# Patient Record
Sex: MALE | Race: WHITE | NOT HISPANIC OR LATINO | Employment: OTHER | URBAN - METROPOLITAN AREA
[De-identification: names, ages, dates, MRNs, and addresses within clinical notes are randomized per-mention and may not be internally consistent; named-entity substitution may affect disease eponyms.]

---

## 2022-04-10 ENCOUNTER — HISTORICAL (OUTPATIENT)
Dept: ADMINISTRATIVE | Facility: HOSPITAL | Age: 57
End: 2022-04-10
Payer: MEDICARE

## 2022-04-27 VITALS
OXYGEN SATURATION: 96 % | DIASTOLIC BLOOD PRESSURE: 90 MMHG | HEIGHT: 70 IN | BODY MASS INDEX: 27.9 KG/M2 | WEIGHT: 194.88 LBS | SYSTOLIC BLOOD PRESSURE: 160 MMHG

## 2022-06-06 ENCOUNTER — OFFICE VISIT (OUTPATIENT)
Dept: RHEUMATOLOGY | Facility: CLINIC | Age: 57
End: 2022-06-06
Payer: MEDICARE

## 2022-06-06 VITALS
DIASTOLIC BLOOD PRESSURE: 80 MMHG | SYSTOLIC BLOOD PRESSURE: 150 MMHG | HEIGHT: 70 IN | TEMPERATURE: 99 F | HEART RATE: 86 BPM | OXYGEN SATURATION: 97 % | BODY MASS INDEX: 27.94 KG/M2 | WEIGHT: 195.19 LBS | RESPIRATION RATE: 18 BRPM

## 2022-06-06 DIAGNOSIS — M06.9 RHEUMATOID ARTHRITIS, INVOLVING UNSPECIFIED SITE, UNSPECIFIED WHETHER RHEUMATOID FACTOR PRESENT: ICD-10-CM

## 2022-06-06 DIAGNOSIS — G80.9 CEREBRAL PALSY, UNSPECIFIED TYPE: ICD-10-CM

## 2022-06-06 DIAGNOSIS — M35.3 POLYMYALGIA RHEUMATICA: Primary | ICD-10-CM

## 2022-06-06 DIAGNOSIS — I10 PRIMARY HYPERTENSION: ICD-10-CM

## 2022-06-06 PROCEDURE — 99213 OFFICE O/P EST LOW 20 MIN: CPT | Mod: PBBFAC | Performed by: INTERNAL MEDICINE

## 2022-06-06 PROCEDURE — 99214 OFFICE O/P EST MOD 30 MIN: CPT | Mod: S$PBB,,, | Performed by: INTERNAL MEDICINE

## 2022-06-06 PROCEDURE — 99214 PR OFFICE/OUTPT VISIT, EST, LEVL IV, 30-39 MIN: ICD-10-PCS | Mod: S$PBB,,, | Performed by: INTERNAL MEDICINE

## 2022-06-06 PROCEDURE — 99999 PR PBB SHADOW E&M-EST. PATIENT-LVL III: ICD-10-PCS | Mod: PBBFAC,,, | Performed by: INTERNAL MEDICINE

## 2022-06-06 PROCEDURE — 99999 PR PBB SHADOW E&M-EST. PATIENT-LVL III: CPT | Mod: PBBFAC,,, | Performed by: INTERNAL MEDICINE

## 2022-06-06 RX ORDER — METHOTREXATE 2.5 MG/1
15 TABLET ORAL
Qty: 30 TABLET | Refills: 5 | Status: SHIPPED | OUTPATIENT
Start: 2022-06-06 | End: 2022-10-06 | Stop reason: SDUPTHER

## 2022-06-06 RX ORDER — FOLIC ACID 1 MG/1
1000 TABLET ORAL EVERY MORNING
COMMUNITY
Start: 2022-04-29 | End: 2022-06-06 | Stop reason: SDUPTHER

## 2022-06-06 RX ORDER — METHOTREXATE 2.5 MG/1
15 TABLET ORAL
COMMUNITY
Start: 2022-04-08 | End: 2022-06-06 | Stop reason: SDUPTHER

## 2022-06-06 RX ORDER — PREDNISONE 5 MG/1
5 TABLET ORAL DAILY
COMMUNITY
Start: 2022-05-04 | End: 2022-06-06 | Stop reason: SDUPTHER

## 2022-06-06 RX ORDER — HYDROXYCHLOROQUINE SULFATE 200 MG/1
200 TABLET, FILM COATED ORAL 2 TIMES DAILY
Qty: 60 TABLET | Refills: 5 | Status: SHIPPED | OUTPATIENT
Start: 2022-06-06 | End: 2022-10-06 | Stop reason: SDUPTHER

## 2022-06-06 RX ORDER — FOLIC ACID 1 MG/1
1000 TABLET ORAL EVERY MORNING
Qty: 30 TABLET | Refills: 5 | Status: SHIPPED | OUTPATIENT
Start: 2022-06-06 | End: 2022-10-06 | Stop reason: SDUPTHER

## 2022-06-06 RX ORDER — AMLODIPINE BESYLATE 5 MG/1
5 TABLET ORAL DAILY
COMMUNITY
Start: 2022-06-06 | End: 2022-06-30

## 2022-06-06 RX ORDER — PREDNISONE 2.5 MG/1
2.5 TABLET ORAL DAILY
Qty: 30 TABLET | Refills: 5 | Status: SHIPPED | OUTPATIENT
Start: 2022-06-06 | End: 2022-10-06 | Stop reason: SDUPTHER

## 2022-06-06 RX ORDER — HYDROXYCHLOROQUINE SULFATE 200 MG/1
200 TABLET, FILM COATED ORAL 2 TIMES DAILY
COMMUNITY
Start: 2022-05-13 | End: 2022-06-06 | Stop reason: SDUPTHER

## 2022-06-06 NOTE — PROGRESS NOTES
"Subjective:       Patient ID: Fracisco Wynn is a 56 y.o. male.    Chief Complaint: 3 month follow up    The patient is complaining of joint pain involving the MCP PIP wrist elbow shoulders hips knees and ankles bilaterally.  The pain is 2/10 in intensity dull in quality and continuous.  That is associated with a morning stiffness lasting for more than 60 minutes.  Is also having difficulty maintaining a good night of sleep.  This has been associated with myalgias.  Muscle aches are 4/10 in intensity dull in quality and continuous.  They are associated with fatigue.  No fever no chills no others.    Review of Systems   Constitutional: Negative for appetite change, chills and fever.   HENT: Negative for congestion, ear pain, mouth sores, nosebleeds and trouble swallowing.    Eyes: Negative for photophobia and discharge.   Respiratory: Negative for chest tightness and shortness of breath.    Cardiovascular: Negative for chest pain.   Gastrointestinal: Negative for abdominal pain and vomiting.   Endocrine: Negative.    Genitourinary: Negative for hematuria.   Musculoskeletal:        As per HPI   Skin: Negative for rash.   Neurological: Negative for weakness.        Cerebral palsy stable         Objective:   BP (!) 150/80 (BP Location: Right arm, Patient Position: Sitting, BP Method: Medium (Automatic))   Pulse 86   Temp 98.8 °F (37.1 °C)   Resp 18   Ht 5' 10" (1.778 m)   Wt 88.5 kg (195 lb 3.2 oz)   SpO2 97%   BMI 28.01 kg/m²      Physical Exam   Constitutional: He is oriented to person, place, and time. He appears well-developed and well-nourished. No distress.   HENT:   Head: Normocephalic and atraumatic.   Right Ear: External ear normal.   Left Ear: External ear normal.   Eyes: Pupils are equal, round, and reactive to light.   Cardiovascular: Normal rate, regular rhythm and normal heart sounds.   Pulmonary/Chest: Breath sounds normal.   Abdominal: Soft. There is no abdominal tenderness.   Musculoskeletal: "      Right shoulder: Tenderness present.      Left shoulder: Tenderness present.      Right elbow: Tenderness present.      Left elbow: Tenderness present.      Right wrist: Tenderness present.      Left wrist: Tenderness present.      Cervical back: Neck supple.      Right hip: Tenderness present.      Left hip: Tenderness present.      Right knee: Tenderness present.      Left knee: Tenderness present.      Right ankle: Tenderness present.      Left ankle: Tenderness present.   Lymphadenopathy:     He has no cervical adenopathy.   Neurological: He is alert and oriented to person, place, and time. He displays normal reflexes. No cranial nerve deficit or sensory deficit. He exhibits normal muscle tone. Coordination normal.   Cerebral palsy stable   Skin: No rash noted. No erythema.   Vitals reviewed.      Right Side Rheumatological Exam     Examination finds the 1st PIP, 1st MCP, 2nd PIP, 2nd MCP, 3rd PIP, 3rd MCP, 4th PIP, 4th MCP, 5th PIP, 5th MCP, temporomandibular, ankle, 1st MTP, 2nd MTP, 3rd MTP, 4th MTP and 5th MTP normal.    The patient is tender to palpation of the shoulder, elbow, wrist, knee, 1st PIP, 1st MCP, 2nd PIP, 2nd MCP, 3rd PIP, 3rd MCP, 4th PIP, 4th MCP, 5th PIP, hip, ankle, 1st MTP, 2nd MTP, 3rd MTP, 4th MTP, 5th MTP, 1st toe IP, 2nd toe IP, 3rd toe IP, 4th toe IP and 5th toe IP    Left Side Rheumatological Exam     Examination finds the 1st PIP, 1st MCP, 2nd PIP, 2nd MCP, 3rd PIP, 3rd MCP, 4th PIP, 4th MCP, 5th PIP, 5th MCP, temporomandibular, ankle, 1st MTP, 2nd MTP, 3rd MTP, 4th MTP and 5th MTP normal.    The patient is tender to palpation of the shoulder, elbow, wrist, knee, 1st PIP, 1st MCP, 2nd PIP, 2nd MCP, 3rd PIP, 3rd MCP, 4th PIP, 4th MCP, 5th PIP, 5th MCP, hip, ankle, 1st MTP, 2nd MTP, 3rd MTP, 4th MTP, 5th MTP, 1st toe IP, 2nd toe IP, 3rd toe IP, 4th toe IP and 5th toe IP.         Completed Fibromyalgia exam 2/18 tender points.  No data to display     Assessment:       1. Polymyalgia  rheumatica    2. Rheumatoid arthritis, involving unspecified site, unspecified whether rheumatoid factor present    3. Primary hypertension    4. Cerebral palsy, unspecified type            Plan:       Problem List Items Addressed This Visit        Neuro    Cerebral palsy    Relevant Medications    folic acid (FOLVITE) 1 MG tablet    hydrOXYchloroQUINE (PLAQUENIL) 200 mg tablet    methotrexate 2.5 MG Tab    predniSONE (DELTASONE) 2.5 MG tablet       Cardiac/Vascular    Hypertension    Relevant Medications    folic acid (FOLVITE) 1 MG tablet    hydrOXYchloroQUINE (PLAQUENIL) 200 mg tablet    methotrexate 2.5 MG Tab    predniSONE (DELTASONE) 2.5 MG tablet       Immunology/Multi System    Polymyalgia rheumatica - Primary    Relevant Medications    folic acid (FOLVITE) 1 MG tablet    hydrOXYchloroQUINE (PLAQUENIL) 200 mg tablet    methotrexate 2.5 MG Tab    predniSONE (DELTASONE) 2.5 MG tablet    Rheumatoid arthritis    Relevant Medications    folic acid (FOLVITE) 1 MG tablet    hydrOXYchloroQUINE (PLAQUENIL) 200 mg tablet    methotrexate 2.5 MG Tab    predniSONE (DELTASONE) 2.5 MG tablet

## 2022-07-12 ENCOUNTER — TELEPHONE (OUTPATIENT)
Dept: RHEUMATOLOGY | Facility: CLINIC | Age: 57
End: 2022-07-12
Payer: MEDICARE

## 2022-10-06 ENCOUNTER — OFFICE VISIT (OUTPATIENT)
Dept: RHEUMATOLOGY | Facility: CLINIC | Age: 57
End: 2022-10-06
Payer: MEDICARE

## 2022-10-06 VITALS
BODY MASS INDEX: 28.2 KG/M2 | HEIGHT: 70 IN | RESPIRATION RATE: 18 BRPM | OXYGEN SATURATION: 98 % | HEART RATE: 80 BPM | WEIGHT: 197 LBS | SYSTOLIC BLOOD PRESSURE: 142 MMHG | DIASTOLIC BLOOD PRESSURE: 92 MMHG | TEMPERATURE: 98 F

## 2022-10-06 DIAGNOSIS — M06.9 RHEUMATOID ARTHRITIS, INVOLVING UNSPECIFIED SITE, UNSPECIFIED WHETHER RHEUMATOID FACTOR PRESENT: ICD-10-CM

## 2022-10-06 DIAGNOSIS — I10 PRIMARY HYPERTENSION: ICD-10-CM

## 2022-10-06 DIAGNOSIS — M35.3 POLYMYALGIA RHEUMATICA: Primary | ICD-10-CM

## 2022-10-06 DIAGNOSIS — G80.9 CEREBRAL PALSY, UNSPECIFIED TYPE: ICD-10-CM

## 2022-10-06 PROCEDURE — 99213 OFFICE O/P EST LOW 20 MIN: CPT | Mod: S$PBB,,, | Performed by: INTERNAL MEDICINE

## 2022-10-06 PROCEDURE — 99999 PR PBB SHADOW E&M-EST. PATIENT-LVL III: ICD-10-PCS | Mod: PBBFAC,,, | Performed by: INTERNAL MEDICINE

## 2022-10-06 PROCEDURE — 99999 PR PBB SHADOW E&M-EST. PATIENT-LVL III: CPT | Mod: PBBFAC,,, | Performed by: INTERNAL MEDICINE

## 2022-10-06 PROCEDURE — 99213 PR OFFICE/OUTPT VISIT, EST, LEVL III, 20-29 MIN: ICD-10-PCS | Mod: S$PBB,,, | Performed by: INTERNAL MEDICINE

## 2022-10-06 PROCEDURE — 99213 OFFICE O/P EST LOW 20 MIN: CPT | Mod: PBBFAC | Performed by: INTERNAL MEDICINE

## 2022-10-06 RX ORDER — PREDNISONE 2.5 MG/1
2.5 TABLET ORAL DAILY
Qty: 30 TABLET | Refills: 5 | Status: SHIPPED | OUTPATIENT
Start: 2022-10-06 | End: 2023-02-24 | Stop reason: SDUPTHER

## 2022-10-06 RX ORDER — HYDROXYCHLOROQUINE SULFATE 200 MG/1
200 TABLET, FILM COATED ORAL 2 TIMES DAILY
Qty: 60 TABLET | Refills: 5 | Status: SHIPPED | OUTPATIENT
Start: 2022-10-06 | End: 2023-02-24 | Stop reason: SDUPTHER

## 2022-10-06 RX ORDER — METHOTREXATE 2.5 MG/1
15 TABLET ORAL
Qty: 30 TABLET | Refills: 5 | Status: SHIPPED | OUTPATIENT
Start: 2022-10-06 | End: 2023-02-24 | Stop reason: SDUPTHER

## 2022-10-06 RX ORDER — FOLIC ACID 1 MG/1
1000 TABLET ORAL EVERY MORNING
Qty: 30 TABLET | Refills: 5 | Status: SHIPPED | OUTPATIENT
Start: 2022-10-06 | End: 2023-02-24 | Stop reason: SDUPTHER

## 2022-10-06 NOTE — PROGRESS NOTES
"Subjective:       Patient ID: Fracisco Wynn is a 57 y.o. male.    Chief Complaint: 4 month follow up (Doing well needs refills)    The patient is complaining of joint pain involving the MCP PIP wrist elbow shoulders hips knees and ankles bilaterally.  The pain is 1/10 in intensity dull in quality and continuous.  That is associated with a morning stiffness lasting for less than 60 minutes.  Is also having difficulty maintaining a good night of sleep.  This has been associated with myalgias.  Muscle aches are 1/10 in intensity dull in quality and continuous.  They are associated with fatigue.  No fever no chills no others.      Review of Systems   Constitutional:  Negative for appetite change, chills and fever.   HENT:  Negative for congestion, ear pain, mouth sores, nosebleeds and trouble swallowing.    Eyes:  Negative for photophobia and discharge.   Respiratory:  Negative for chest tightness and shortness of breath.    Cardiovascular:  Negative for chest pain.   Gastrointestinal:  Negative for abdominal pain and vomiting.   Endocrine: Negative.    Genitourinary:  Negative for hematuria.   Musculoskeletal:         As per HPI   Skin:  Negative for rash.   Neurological:  Negative for weakness.       Objective:   BP (!) 142/92 (BP Location: Right arm, Patient Position: Sitting, BP Method: Large (Automatic))   Pulse 80   Temp 97.8 °F (36.6 °C) (Temporal)   Resp 18   Ht 5' 10" (1.778 m)   Wt 89.4 kg (197 lb)   SpO2 98%   BMI 28.27 kg/m²      Physical Exam   Constitutional: He is oriented to person, place, and time. He appears well-developed and well-nourished. No distress.   HENT:   Head: Normocephalic and atraumatic.   Right Ear: External ear normal.   Left Ear: External ear normal.   Eyes: Pupils are equal, round, and reactive to light.   Cardiovascular: Normal rate, regular rhythm and normal heart sounds.   Pulmonary/Chest: Breath sounds normal.   Abdominal: Soft. There is no abdominal tenderness. "   Musculoskeletal:      Right shoulder: Normal.      Left shoulder: Normal.      Right elbow: Normal.      Left elbow: Normal.      Right wrist: Normal.      Left wrist: Normal.      Cervical back: Neck supple.      Right hip: Normal.      Left hip: Normal.      Right knee: Normal.      Left knee: Normal.   Lymphadenopathy:     He has no cervical adenopathy.   Neurological: He is alert and oriented to person, place, and time. He displays normal reflexes. No cranial nerve deficit or sensory deficit. He exhibits normal muscle tone. Coordination normal.   Skin: No rash noted. No erythema.   Vitals reviewed.      Right Side Rheumatological Exam     Examination finds the shoulder, elbow, wrist, knee, 1st PIP, 1st MCP, 2nd PIP, 2nd MCP, 3rd PIP, 3rd MCP, 4th PIP, 4th MCP, 5th PIP, 5th MCP, temporomandibular, hip, ankle, 1st MTP, 2nd MTP, 3rd MTP, 4th MTP and 5th MTP normal.    Left Side Rheumatological Exam     Examination finds the shoulder, elbow, wrist, knee, 1st PIP, 1st MCP, 2nd PIP, 2nd MCP, 3rd PIP, 3rd MCP, 4th PIP, 4th MCP, 5th PIP, 5th MCP, temporomandibular, hip, ankle, 1st MTP, 2nd MTP, 3rd MTP, 4th MTP and 5th MTP normal.       Completed Fibromyalgia exam 2/18 tender points.  No data to display     Assessment:       1. Polymyalgia rheumatica    2. Rheumatoid arthritis, involving unspecified site, unspecified whether rheumatoid factor present    3. Primary hypertension    4. Primary hypertension    5. Cerebral palsy, unspecified type              Plan:       Problem List Items Addressed This Visit          Neuro    Cerebral palsy    Relevant Medications    folic acid (FOLVITE) 1 MG tablet    hydrOXYchloroQUINE (PLAQUENIL) 200 mg tablet    methotrexate 2.5 MG Tab    predniSONE (DELTASONE) 2.5 MG tablet       Cardiac/Vascular    Hypertension    Relevant Medications    folic acid (FOLVITE) 1 MG tablet    hydrOXYchloroQUINE (PLAQUENIL) 200 mg tablet    methotrexate 2.5 MG Tab    predniSONE (DELTASONE) 2.5 MG  tablet       Immunology/Multi System    Polymyalgia rheumatica - Primary    Relevant Medications    folic acid (FOLVITE) 1 MG tablet    hydrOXYchloroQUINE (PLAQUENIL) 200 mg tablet    methotrexate 2.5 MG Tab    predniSONE (DELTASONE) 2.5 MG tablet    Rheumatoid arthritis    Relevant Medications    folic acid (FOLVITE) 1 MG tablet    hydrOXYchloroQUINE (PLAQUENIL) 200 mg tablet    methotrexate 2.5 MG Tab    predniSONE (DELTASONE) 2.5 MG tablet

## 2022-10-10 DIAGNOSIS — I10 PRIMARY HYPERTENSION: ICD-10-CM

## 2022-10-10 DIAGNOSIS — Z00.00 WELLNESS EXAMINATION: Primary | ICD-10-CM

## 2023-01-31 ENCOUNTER — TELEPHONE (OUTPATIENT)
Dept: INTERNAL MEDICINE | Facility: CLINIC | Age: 58
End: 2023-01-31
Payer: MEDICARE

## 2023-02-01 ENCOUNTER — OFFICE VISIT (OUTPATIENT)
Dept: INTERNAL MEDICINE | Facility: CLINIC | Age: 58
End: 2023-02-01
Payer: MEDICARE

## 2023-02-01 VITALS
OXYGEN SATURATION: 96 % | BODY MASS INDEX: 27.35 KG/M2 | DIASTOLIC BLOOD PRESSURE: 80 MMHG | HEART RATE: 94 BPM | HEIGHT: 70 IN | SYSTOLIC BLOOD PRESSURE: 132 MMHG | WEIGHT: 191 LBS | TEMPERATURE: 97 F

## 2023-02-01 DIAGNOSIS — G80.9 CEREBRAL PALSY, UNSPECIFIED TYPE: ICD-10-CM

## 2023-02-01 DIAGNOSIS — Z00.00 WELLNESS EXAMINATION: Primary | ICD-10-CM

## 2023-02-01 DIAGNOSIS — M35.3 POLYMYALGIA RHEUMATICA: ICD-10-CM

## 2023-02-01 PROBLEM — I10 HYPERTENSION: Status: RESOLVED | Noted: 2022-06-06 | Resolved: 2023-02-01

## 2023-02-01 PROCEDURE — 99396 PREV VISIT EST AGE 40-64: CPT | Mod: GZ,,, | Performed by: STUDENT IN AN ORGANIZED HEALTH CARE EDUCATION/TRAINING PROGRAM

## 2023-02-01 PROCEDURE — 99396 PR PREVENTIVE VISIT,EST,40-64: ICD-10-PCS | Mod: GZ,,, | Performed by: STUDENT IN AN ORGANIZED HEALTH CARE EDUCATION/TRAINING PROGRAM

## 2023-02-02 NOTE — PROGRESS NOTES
Subjective:      Fracisco Wynn  02/01/2023  [id number]      Chief Complaint: Annual Exam       HPI:  Mr Yap presents for annual wellness visit. Patient is doing well, no complaints today. Patient had been started on amlodipine 5 mg however blood pressure has been better controlled since lowering prednisone dose. Has stopped taking medication.     History reviewed. No pertinent past medical history.  Past Surgical History:   Procedure Laterality Date    CYST REMOVAL Right     right shoulder    FOOT SURGERY Bilateral     HERNIA REPAIR       History reviewed. No pertinent family history.  Social History     Tobacco Use    Smoking status: Never    Smokeless tobacco: Never   Substance and Sexual Activity    Alcohol use: Not Currently    Drug use: Not Currently    Sexual activity: Not Currently     Review of patient's allergies indicates:  No Known Allergies    The following were reviewed at this visit: active problem list, medication list, allergies, family history, social history, and health maintenance.    Medications:    Current Outpatient Medications:     folic acid (FOLVITE) 1 MG tablet, Take 1 tablet (1,000 mcg total) by mouth every morning., Disp: 30 tablet, Rfl: 5    hydrOXYchloroQUINE (PLAQUENIL) 200 mg tablet, Take 1 tablet (200 mg total) by mouth 2 (two) times daily., Disp: 60 tablet, Rfl: 5    methotrexate 2.5 MG Tab, Take 6 tablets (15 mg total) by mouth every 7 days., Disp: 30 tablet, Rfl: 5    predniSONE (DELTASONE) 2.5 MG tablet, Take 1 tablet (2.5 mg total) by mouth once daily., Disp: 30 tablet, Rfl: 5      Medications have been reviewed and reconciled with patient at this visit.  Barriers to medications reviewed with patient.    Adverse reactions to current medications reviewed with patient..    Over the counter medications reviewed and reconciled with patient.  Review of Systems   Constitutional:  Negative for chills, diaphoresis, fever and weight loss.   HENT:  Negative for congestion, ear  "discharge, sinus pain and sore throat.    Eyes:  Negative for photophobia.   Respiratory:  Negative for cough, hemoptysis and shortness of breath.    Cardiovascular:  Negative for chest pain, palpitations, claudication, leg swelling and PND.   Gastrointestinal:  Negative for constipation, diarrhea, nausea and vomiting.   Genitourinary:  Negative for dysuria, frequency and urgency.   Musculoskeletal:  Negative for back pain, joint pain, myalgias and neck pain.   Skin:  Negative for itching and rash.   Neurological:  Negative for dizziness, focal weakness and headaches.   Psychiatric/Behavioral:  Negative for depression. The patient does not have insomnia.          Objective:      Vitals:    02/01/23 1006   BP: 132/80   Pulse: 94   Temp: 97.3 °F (36.3 °C)   TempSrc: Temporal   SpO2: 96%   Weight: 86.6 kg (191 lb)   Height: 5' 10" (1.778 m)       Physical Exam  Constitutional:       Appearance: Normal appearance.   HENT:      Head: Normocephalic and atraumatic.   Cardiovascular:      Rate and Rhythm: Normal rate and regular rhythm.      Pulses: Normal pulses.   Pulmonary:      Effort: Pulmonary effort is normal.      Breath sounds: Normal breath sounds.   Abdominal:      General: Abdomen is flat. Bowel sounds are normal. There is no distension.      Palpations: Abdomen is soft.      Tenderness: There is no abdominal tenderness.   Musculoskeletal:         General: No tenderness. Normal range of motion.      Right lower leg: No edema.      Left lower leg: No edema.   Lymphadenopathy:      Cervical: No cervical adenopathy.   Neurological:      General: No focal deficit present.      Mental Status: He is alert and oriented to person, place, and time.             Assessment and Plan:       1. Wellness examination  Assessment & Plan:  Colonoscopy: up to date  Labs: due for lipid panel, ordered today       2. Polymyalgia rheumatica  Assessment & Plan:  Follows with Rheumatology      3. Cerebral palsy, unspecified " type  Assessment & Plan:  Stable  Follows with Neurology               Follow up: Follow up in about 6 months (around 8/1/2023) for Bp follow up.

## 2023-02-13 ENCOUNTER — LAB VISIT (OUTPATIENT)
Dept: LAB | Facility: HOSPITAL | Age: 58
End: 2023-02-13
Attending: STUDENT IN AN ORGANIZED HEALTH CARE EDUCATION/TRAINING PROGRAM
Payer: MEDICARE

## 2023-02-13 DIAGNOSIS — I10 PRIMARY HYPERTENSION: ICD-10-CM

## 2023-02-13 DIAGNOSIS — Z00.00 WELLNESS EXAMINATION: ICD-10-CM

## 2023-02-13 LAB
CHOLEST SERPL-MCNC: 178 MG/DL
CHOLEST/HDLC SERPL: 5 {RATIO} (ref 0–5)
HDLC SERPL-MCNC: 38 MG/DL (ref 35–60)
LDLC SERPL CALC-MCNC: 113 MG/DL (ref 50–140)
TRIGL SERPL-MCNC: 135 MG/DL (ref 34–140)
VLDLC SERPL CALC-MCNC: 27 MG/DL

## 2023-02-13 PROCEDURE — 80061 LIPID PANEL: CPT

## 2023-02-24 ENCOUNTER — OFFICE VISIT (OUTPATIENT)
Dept: RHEUMATOLOGY | Facility: CLINIC | Age: 58
End: 2023-02-24
Payer: MEDICARE

## 2023-02-24 VITALS
TEMPERATURE: 99 F | DIASTOLIC BLOOD PRESSURE: 89 MMHG | HEIGHT: 70 IN | HEART RATE: 84 BPM | SYSTOLIC BLOOD PRESSURE: 132 MMHG | OXYGEN SATURATION: 98 % | WEIGHT: 196 LBS | BODY MASS INDEX: 28.06 KG/M2

## 2023-02-24 DIAGNOSIS — M35.3 POLYMYALGIA RHEUMATICA: ICD-10-CM

## 2023-02-24 DIAGNOSIS — I10 PRIMARY HYPERTENSION: ICD-10-CM

## 2023-02-24 DIAGNOSIS — M06.9 RHEUMATOID ARTHRITIS, INVOLVING UNSPECIFIED SITE, UNSPECIFIED WHETHER RHEUMATOID FACTOR PRESENT: ICD-10-CM

## 2023-02-24 DIAGNOSIS — G80.9 CEREBRAL PALSY, UNSPECIFIED TYPE: Primary | ICD-10-CM

## 2023-02-24 PROCEDURE — 99214 OFFICE O/P EST MOD 30 MIN: CPT | Mod: S$PBB,,, | Performed by: INTERNAL MEDICINE

## 2023-02-24 PROCEDURE — 99214 PR OFFICE/OUTPT VISIT, EST, LEVL IV, 30-39 MIN: ICD-10-PCS | Mod: S$PBB,,, | Performed by: INTERNAL MEDICINE

## 2023-02-24 PROCEDURE — 99213 OFFICE O/P EST LOW 20 MIN: CPT | Mod: PBBFAC | Performed by: INTERNAL MEDICINE

## 2023-02-24 PROCEDURE — 99999 PR PBB SHADOW E&M-EST. PATIENT-LVL III: ICD-10-PCS | Mod: PBBFAC,,, | Performed by: INTERNAL MEDICINE

## 2023-02-24 PROCEDURE — 99999 PR PBB SHADOW E&M-EST. PATIENT-LVL III: CPT | Mod: PBBFAC,,, | Performed by: INTERNAL MEDICINE

## 2023-02-24 RX ORDER — METHOTREXATE 2.5 MG/1
15 TABLET ORAL
Qty: 30 TABLET | Refills: 5 | Status: SHIPPED | OUTPATIENT
Start: 2023-02-24 | End: 2023-06-27 | Stop reason: SDUPTHER

## 2023-02-24 RX ORDER — FOLIC ACID 1 MG/1
1000 TABLET ORAL EVERY MORNING
Qty: 30 TABLET | Refills: 5 | Status: SHIPPED | OUTPATIENT
Start: 2023-02-24 | End: 2023-06-27 | Stop reason: SDUPTHER

## 2023-02-24 RX ORDER — PREDNISONE 2.5 MG/1
2.5 TABLET ORAL DAILY PRN
Qty: 30 TABLET | Refills: 5 | Status: SHIPPED | OUTPATIENT
Start: 2023-02-24 | End: 2023-08-02

## 2023-02-24 RX ORDER — HYDROXYCHLOROQUINE SULFATE 200 MG/1
200 TABLET, FILM COATED ORAL 2 TIMES DAILY
Qty: 60 TABLET | Refills: 5 | Status: SHIPPED | OUTPATIENT
Start: 2023-02-24 | End: 2023-06-27 | Stop reason: SDUPTHER

## 2023-02-24 NOTE — PROGRESS NOTES
"Subjective:       Patient ID: Fracisco Wynn is a 57 y.o. male.    Chief Complaint: Follow-up (Follow up. No complaints)    The patient completed 2 years of steroids for PMR. I will change the prednisone to only PRN and continue the steroids sparing drugs  The patient is complaining of joint pain involving the MCP PIP wrist elbow shoulders hips knees and ankles bilaterally.  The pain is 1/10 in intensity dull in quality and continuous.  That is associated with a morning stiffness lasting for less than 60 minutes.  Is not having difficulty maintaining a good night of sleep.    No fever no chills no others.  Labs checked during this visit    Review of Systems   Constitutional:  Negative for appetite change, chills and fever.   HENT:  Negative for congestion, ear pain, mouth sores, nosebleeds and trouble swallowing.    Eyes:  Negative for photophobia and discharge.   Respiratory:  Negative for chest tightness and shortness of breath.    Cardiovascular:  Negative for chest pain.   Gastrointestinal:  Negative for abdominal pain and vomiting.   Endocrine: Negative.    Genitourinary:  Negative for hematuria.   Musculoskeletal:         As per HPI   Skin:  Negative for rash.   Neurological:  Negative for weakness.       Objective:   /89 (BP Location: Right arm, Patient Position: Sitting, BP Method: Medium (Automatic))   Pulse 84   Temp 98.7 °F (37.1 °C) (Temporal)   Ht 5' 10" (1.778 m)   Wt 88.9 kg (196 lb)   SpO2 98%   BMI 28.12 kg/m²      Physical Exam   Constitutional: He is oriented to person, place, and time. He appears well-developed and well-nourished. No distress.   HENT:   Head: Normocephalic and atraumatic.   Right Ear: External ear normal.   Left Ear: External ear normal.   Eyes: Pupils are equal, round, and reactive to light.   Cardiovascular: Normal rate, regular rhythm and normal heart sounds.   Pulmonary/Chest: Breath sounds normal.   Abdominal: Soft. There is no abdominal tenderness. "   Musculoskeletal:      Right shoulder: Normal.      Left shoulder: Normal.      Right elbow: Normal.      Left elbow: Normal.      Right wrist: Normal.      Left wrist: Normal.      Cervical back: Neck supple.      Right hip: Normal.      Left hip: Normal.      Right knee: Normal.      Left knee: Normal.   Lymphadenopathy:     He has no cervical adenopathy.   Neurological: He is alert and oriented to person, place, and time. He displays normal reflexes. No cranial nerve deficit or sensory deficit. He exhibits normal muscle tone. Coordination normal.   Skin: No rash noted. No erythema.   Vitals reviewed.      Right Side Rheumatological Exam     Examination finds the shoulder, elbow, wrist, knee, 1st PIP, 1st MCP, 2nd PIP, 2nd MCP, 3rd PIP, 3rd MCP, 4th PIP, 4th MCP, 5th PIP, 5th MCP, temporomandibular, hip, ankle, 1st MTP, 2nd MTP, 3rd MTP, 4th MTP and 5th MTP normal.    Left Side Rheumatological Exam     Examination finds the shoulder, elbow, wrist, knee, 1st PIP, 1st MCP, 2nd PIP, 2nd MCP, 3rd PIP, 3rd MCP, 4th PIP, 4th MCP, 5th PIP, 5th MCP, temporomandibular, hip, ankle, 1st MTP, 2nd MTP, 3rd MTP, 4th MTP and 5th MTP normal.       Completed Fibromyalgia exam 2/18 tender points.  No data to display     Assessment:       1. Cerebral palsy, unspecified type    2. Polymyalgia rheumatica    3. Rheumatoid arthritis, involving unspecified site, unspecified whether rheumatoid factor present    4. Primary hypertension            Medication List with Changes/Refills   Changed and/or Refilled Medications    Modified Medication Previous Medication    FOLIC ACID (FOLVITE) 1 MG TABLET folic acid (FOLVITE) 1 MG tablet       Take 1 tablet (1,000 mcg total) by mouth every morning.    Take 1 tablet (1,000 mcg total) by mouth every morning.       Start Date: 2/24/2023 End Date: --    Start Date: 10/6/2022 End Date: 2/24/2023    HYDROXYCHLOROQUINE (PLAQUENIL) 200 MG TABLET hydrOXYchloroQUINE (PLAQUENIL) 200 mg tablet       Take 1  tablet (200 mg total) by mouth 2 (two) times daily.    Take 1 tablet (200 mg total) by mouth 2 (two) times daily.       Start Date: 2/24/2023 End Date: --    Start Date: 10/6/2022 End Date: 2/24/2023    METHOTREXATE 2.5 MG TAB methotrexate 2.5 MG Tab       Take 6 tablets (15 mg total) by mouth every 7 days.    Take 6 tablets (15 mg total) by mouth every 7 days.       Start Date: 2/24/2023 End Date: --    Start Date: 10/6/2022 End Date: 2/24/2023    PREDNISONE (DELTASONE) 2.5 MG TABLET predniSONE (DELTASONE) 2.5 MG tablet       Take 1 tablet (2.5 mg total) by mouth daily as needed (flare ups/pain).    Take 1 tablet (2.5 mg total) by mouth once daily.       Start Date: 2/24/2023 End Date: --    Start Date: 10/6/2022 End Date: 2/24/2023         Plan:         Problem List Items Addressed This Visit          Neuro    Cerebral palsy - Primary    Relevant Medications    folic acid (FOLVITE) 1 MG tablet    hydrOXYchloroQUINE (PLAQUENIL) 200 mg tablet    methotrexate 2.5 MG Tab    predniSONE (DELTASONE) 2.5 MG tablet       Immunology/Multi System    Polymyalgia rheumatica    Relevant Medications    folic acid (FOLVITE) 1 MG tablet    hydrOXYchloroQUINE (PLAQUENIL) 200 mg tablet    methotrexate 2.5 MG Tab    predniSONE (DELTASONE) 2.5 MG tablet    Rheumatoid arthritis    Relevant Medications    folic acid (FOLVITE) 1 MG tablet    hydrOXYchloroQUINE (PLAQUENIL) 200 mg tablet    methotrexate 2.5 MG Tab    predniSONE (DELTASONE) 2.5 MG tablet     Other Visit Diagnoses       Primary hypertension        reduce prednisone to 2.5mg daily.    Relevant Medications    folic acid (FOLVITE) 1 MG tablet    hydrOXYchloroQUINE (PLAQUENIL) 200 mg tablet    methotrexate 2.5 MG Tab    predniSONE (DELTASONE) 2.5 MG tablet

## 2023-05-03 ENCOUNTER — TELEPHONE (OUTPATIENT)
Dept: RHEUMATOLOGY | Facility: CLINIC | Age: 58
End: 2023-05-03
Payer: MEDICARE

## 2023-05-03 DIAGNOSIS — L40.9 PSORIASIS: Primary | ICD-10-CM

## 2023-05-03 RX ORDER — KETOCONAZOLE 20 MG/G
CREAM TOPICAL DAILY
Qty: 60 G | Refills: 5 | Status: SHIPPED | OUTPATIENT
Start: 2023-05-03

## 2023-05-03 NOTE — TELEPHONE ENCOUNTER
----- Message from India Masters sent at 5/3/2023  2:28 PM CDT -----  Regarding: Medication refill  Mother (Regina Hernandez) called requesting refill for Ketoconazole (2% cream). It was prescribed about (1) year ago for psoriasis. [facility] in the [facility]

## 2023-05-08 PROBLEM — Z00.00 WELLNESS EXAMINATION: Status: RESOLVED | Noted: 2023-02-01 | Resolved: 2023-05-08

## 2023-06-26 NOTE — PROGRESS NOTES
"Subjective:           Patient ID: Fracisco Wynn is a 57 y.o. male.    Chief Complaint: Disease Management (4 month follow up )      Mr. Wynn is a 57-year-old male here for follow-up.  History of cerebral palsy and ambulating with walker today. The patient completed 2 years of steroids for PMR.  His blood pressure is elevated today. Reports his blood pressure raises on occasion and was only started on the Amlodopine because of HTN at dentist implant office. Reports he does not take it daily, he does have an appointment with his PCP next month. Denies headache, blurry vision, or nose bleeds.    Today The patient is reporting joint pain involving the MCP PIP wrist elbow shoulders hips knees and ankles bilaterally but mostly in his hips. The pain is 2/10 in intensity dull in quality and continuous.  That is associated with a morning stiffness lasting for less than 30 minutes.   This has been associated with myalgias.  Muscle aches are 2/10 in intensity dull in quality and continuous.  They are associated with fatigue.  Denies fever, chills, or recent infections.  He feels like his medications are all working well for him right now.       Review of Systems   Constitutional:  Negative for appetite change, chills and fever.   HENT:  Negative for congestion, ear pain, mouth sores, nosebleeds and trouble swallowing.    Eyes:  Negative for photophobia and discharge.   Respiratory:  Negative for chest tightness and shortness of breath.    Cardiovascular:  Negative for chest pain.   Gastrointestinal:  Negative for abdominal pain and vomiting.   Endocrine: Negative.    Genitourinary:  Negative for hematuria.   Musculoskeletal:         As per HPI   Skin:  Negative for rash.   Neurological:  Negative for weakness.       Objective:   /78 (BP Location: Right arm, Patient Position: Sitting, BP Method: Medium (Automatic))   Pulse 79   Resp 18   Ht 5' 10" (1.778 m)   Wt 89.1 kg (196 lb 6.4 oz)   SpO2 96%   BMI " 28.18 kg/m²          Physical Exam   Constitutional: He is oriented to person, place, and time. He appears well-developed and well-nourished. No distress.   HENT:   Head: Normocephalic and atraumatic.   Right Ear: External ear normal.   Left Ear: External ear normal.   Eyes: Pupils are equal, round, and reactive to light.   Cardiovascular: Normal rate.   Pulmonary/Chest: Effort normal and breath sounds normal.   Abdominal: Soft. There is no abdominal tenderness.   Musculoskeletal:      Cervical back: Neck supple.      Comments: Left hand contracture   Lymphadenopathy:     He has no cervical adenopathy.   Neurological: He is alert and oriented to person, place, and time. He displays normal reflexes. No cranial nerve deficit or sensory deficit. He exhibits normal muscle tone. Coordination normal.   Skin: No rash noted. No erythema.   Vitals reviewed.     Completed Fibromyalgia exam 12/18 tender points.    No data to display     Assessment:         Medication List with Changes/Refills   Current Medications    AMLODIPINE (NORVASC) 5 MG TABLET    Take 5 mg by mouth once daily.       Start Date: 4/8/2022  End Date: --    DIAZEPAM (VALIUM) 5 MG TABLET    Take 5 mg by mouth as needed.       Start Date: 2/23/2023 End Date: --    KETOCONAZOLE (NIZORAL) 2 % CREAM    Apply topically once daily.       Start Date: 5/3/2023  End Date: --    PREDNISONE (DELTASONE) 2.5 MG TABLET    Take 1 tablet (2.5 mg total) by mouth daily as needed (flare ups/pain).       Start Date: 2/24/2023 End Date: --   Changed and/or Refilled Medications    Modified Medication Previous Medication    FOLIC ACID (FOLVITE) 1 MG TABLET folic acid (FOLVITE) 1 MG tablet       Take 1 tablet (1,000 mcg total) by mouth every morning.    Take 1 tablet (1,000 mcg total) by mouth every morning.       Start Date: 6/27/2023 End Date: --    Start Date: 2/24/2023 End Date: 6/27/2023    HYDROXYCHLOROQUINE (PLAQUENIL) 200 MG TABLET hydrOXYchloroQUINE (PLAQUENIL) 200 mg  tablet       Take 1 tablet (200 mg total) by mouth 2 (two) times daily.    Take 1 tablet (200 mg total) by mouth 2 (two) times daily.       Start Date: 6/27/2023 End Date: --    Start Date: 2/24/2023 End Date: 6/27/2023    METHOTREXATE 2.5 MG TAB methotrexate 2.5 MG Tab       Take 6 tablets (15 mg total) by mouth every 7 days.    Take 6 tablets (15 mg total) by mouth every 7 days.       Start Date: 6/27/2023 End Date: --    Start Date: 2/24/2023 End Date: 6/27/2023         ICD-10-CM ICD-9-CM   1. Rheumatoid arthritis, involving unspecified site, unspecified whether rheumatoid factor present  M06.9 714.0   2. Polymyalgia rheumatica  M35.3 725   3. Cerebral palsy, unspecified type  G80.9 343.9   4. Primary hypertension  I10 401.9           Plan:       1. Rheumatoid arthritis, involving unspecified site, unspecified whether rheumatoid factor present  Assessment & Plan:  Continue methotrexate 15 mg Q 7 days  Continue folic acid daily  Continue hydroxychloroquine 200 mg b.i.d.    Patient reports he still mows his grass and uses stationary bike at home. He has not been taking the prednisone as needed because he has not needed it. He is happy with his current treatment    Labs ordered today for continued monitoring (on MTX). CMP completed in Feb 2023 but no CBC to review.      Orders:  -     methotrexate 2.5 MG Tab; Take 6 tablets (15 mg total) by mouth every 7 days.  Dispense: 30 tablet; Refill: 5  -     folic acid (FOLVITE) 1 MG tablet; Take 1 tablet (1,000 mcg total) by mouth every morning.  Dispense: 30 tablet; Refill: 5  -     hydrOXYchloroQUINE (PLAQUENIL) 200 mg tablet; Take 1 tablet (200 mg total) by mouth 2 (two) times daily.  Dispense: 60 tablet; Refill: 5  -     CBC Auto Differential; Future; Expected date: 06/27/2023  -     Comprehensive Metabolic Panel; Future; Expected date: 06/27/2023  -     C-Reactive Protein; Future; Expected date: 06/27/2023    2. Polymyalgia rheumatica  Overview:  The patient completed 2  years of steroids for PMR. I will change the prednisone to only PRN and continue the steroids sparing drugs    Assessment & Plan:  Continue prednisone 2.5 mg daily p.r.n.    Orders:  -     methotrexate 2.5 MG Tab; Take 6 tablets (15 mg total) by mouth every 7 days.  Dispense: 30 tablet; Refill: 5  -     folic acid (FOLVITE) 1 MG tablet; Take 1 tablet (1,000 mcg total) by mouth every morning.  Dispense: 30 tablet; Refill: 5  -     hydrOXYchloroQUINE (PLAQUENIL) 200 mg tablet; Take 1 tablet (200 mg total) by mouth 2 (two) times daily.  Dispense: 60 tablet; Refill: 5    3. Cerebral palsy, unspecified type    4. Primary hypertension  Comments:  reduce prednisone to 2.5mg daily.  Assessment & Plan:  BP today is 183/84  Manual recheck: 136/78  Continue RX medications as prescribed  Low Sodium Diet (Dash Diet - less than 2 grams of sodium per day).  Maintain healthy weight with goal BMI <30. Exercise 30 minutes per day 5 days per week.      Reports his blood pressure raises on occasion and was only started on the Amlodopine because of HTN at dentist implant office. Reports he does not take it daily, he does have an appointment with his PCP next month. Denies headache, blurry vision, or nose bleeds.             31 minutes of total time spent on the encounter, which includes face to face time and non-face to face time preparing to see the patient (eg, review of tests), Obtaining and/or reviewing separately obtained history, Documenting clinical information in the electronic or other health record, Independently interpreting results (not separately reported) and communicating results to the patient/family/caregiver, or Care coordination (not separately reported).

## 2023-06-26 NOTE — ASSESSMENT & PLAN NOTE
Continue methotrexate 15 mg Q 7 days  Continue folic acid daily  Continue hydroxychloroquine 200 mg b.i.d.    Patient reports he still mows his grass and uses stationary bike at home. He has not been taking the prednisone as needed because he has not needed it. He is happy with his current treatment    Labs ordered today for continued monitoring (on MTX). CMP completed in Feb 2023 but no CBC to review.

## 2023-06-27 ENCOUNTER — OFFICE VISIT (OUTPATIENT)
Dept: RHEUMATOLOGY | Facility: CLINIC | Age: 58
End: 2023-06-27
Payer: MEDICARE

## 2023-06-27 VITALS
HEART RATE: 79 BPM | BODY MASS INDEX: 28.12 KG/M2 | RESPIRATION RATE: 18 BRPM | DIASTOLIC BLOOD PRESSURE: 78 MMHG | SYSTOLIC BLOOD PRESSURE: 136 MMHG | HEIGHT: 70 IN | WEIGHT: 196.38 LBS | OXYGEN SATURATION: 96 %

## 2023-06-27 DIAGNOSIS — M06.9 RHEUMATOID ARTHRITIS, INVOLVING UNSPECIFIED SITE, UNSPECIFIED WHETHER RHEUMATOID FACTOR PRESENT: ICD-10-CM

## 2023-06-27 DIAGNOSIS — I10 PRIMARY HYPERTENSION: ICD-10-CM

## 2023-06-27 DIAGNOSIS — M35.3 POLYMYALGIA RHEUMATICA: ICD-10-CM

## 2023-06-27 DIAGNOSIS — G80.9 CEREBRAL PALSY, UNSPECIFIED TYPE: ICD-10-CM

## 2023-06-27 PROCEDURE — 99213 OFFICE O/P EST LOW 20 MIN: CPT | Mod: PBBFAC

## 2023-06-27 PROCEDURE — 99214 OFFICE O/P EST MOD 30 MIN: CPT | Mod: S$PBB,,,

## 2023-06-27 PROCEDURE — 99999 PR PBB SHADOW E&M-EST. PATIENT-LVL III: ICD-10-PCS | Mod: PBBFAC,,,

## 2023-06-27 PROCEDURE — 99214 PR OFFICE/OUTPT VISIT, EST, LEVL IV, 30-39 MIN: ICD-10-PCS | Mod: S$PBB,,,

## 2023-06-27 PROCEDURE — 99999 PR PBB SHADOW E&M-EST. PATIENT-LVL III: CPT | Mod: PBBFAC,,,

## 2023-06-27 RX ORDER — AMLODIPINE BESYLATE 5 MG/1
5 TABLET ORAL DAILY
COMMUNITY
Start: 2022-04-08 | End: 2023-08-02

## 2023-06-27 RX ORDER — HYDROXYCHLOROQUINE SULFATE 200 MG/1
200 TABLET, FILM COATED ORAL 2 TIMES DAILY
Qty: 60 TABLET | Refills: 5 | Status: SHIPPED | OUTPATIENT
Start: 2023-06-27 | End: 2024-01-05 | Stop reason: SDUPTHER

## 2023-06-27 RX ORDER — DIAZEPAM 5 MG/1
5 TABLET ORAL
COMMUNITY
Start: 2023-02-23 | End: 2023-08-02

## 2023-06-27 RX ORDER — FOLIC ACID 1 MG/1
1000 TABLET ORAL EVERY MORNING
Qty: 30 TABLET | Refills: 5 | Status: SHIPPED | OUTPATIENT
Start: 2023-06-27 | End: 2024-01-05 | Stop reason: SDUPTHER

## 2023-06-27 RX ORDER — METHOTREXATE 2.5 MG/1
15 TABLET ORAL
Qty: 30 TABLET | Refills: 5 | Status: SHIPPED | OUTPATIENT
Start: 2023-06-27 | End: 2024-01-05 | Stop reason: SDUPTHER

## 2023-06-27 NOTE — ASSESSMENT & PLAN NOTE
BP today is 183/84  Manual recheck: 136/78  Continue RX medications as prescribed  Low Sodium Diet (Dash Diet - less than 2 grams of sodium per day).  Maintain healthy weight with goal BMI <30. Exercise 30 minutes per day 5 days per week.      Reports his blood pressure raises on occasion and was only started on the Amlodopine because of HTN at dentist implant office. Reports he does not take it daily, he does have an appointment with his PCP next month. Denies headache, blurry vision, or nose bleeds.

## 2023-07-26 ENCOUNTER — TELEPHONE (OUTPATIENT)
Dept: INTERNAL MEDICINE | Facility: CLINIC | Age: 58
End: 2023-07-26
Payer: MEDICARE

## 2023-08-02 ENCOUNTER — OFFICE VISIT (OUTPATIENT)
Dept: INTERNAL MEDICINE | Facility: CLINIC | Age: 58
End: 2023-08-02
Payer: MEDICARE

## 2023-08-02 VITALS
OXYGEN SATURATION: 96 % | DIASTOLIC BLOOD PRESSURE: 84 MMHG | BODY MASS INDEX: 28.06 KG/M2 | HEART RATE: 83 BPM | SYSTOLIC BLOOD PRESSURE: 132 MMHG | HEIGHT: 70 IN | WEIGHT: 196 LBS | RESPIRATION RATE: 16 BRPM

## 2023-08-02 DIAGNOSIS — I10 PRIMARY HYPERTENSION: ICD-10-CM

## 2023-08-02 PROCEDURE — 99213 OFFICE O/P EST LOW 20 MIN: CPT | Mod: ,,, | Performed by: STUDENT IN AN ORGANIZED HEALTH CARE EDUCATION/TRAINING PROGRAM

## 2023-08-02 PROCEDURE — 99213 PR OFFICE/OUTPT VISIT, EST, LEVL III, 20-29 MIN: ICD-10-PCS | Mod: ,,, | Performed by: STUDENT IN AN ORGANIZED HEALTH CARE EDUCATION/TRAINING PROGRAM

## 2023-08-04 NOTE — PROGRESS NOTES
Subjective:      Fracisco Wynn  08/[birth year]2023  [id number]      Chief Complaint: Follow-up (B/p follow up)       HPI:  Mr Yap presents for 6 months follow up. Patient does not have any complaints. Blood pressure is well controlled     Past Medical History:   Diagnosis Date    Cerebral palsy, unspecified     Polymyalgia rheumatica     Rheumatoid arthritis, unspecified      Past Surgical History:   Procedure Laterality Date    CYST REMOVAL Right     right shoulder    FOOT SURGERY Bilateral     HERNIA REPAIR       History reviewed. No pertinent family history.  Social History     Tobacco Use    Smoking status: Never    Smokeless tobacco: Never   Substance and Sexual Activity    Alcohol use: Not Currently    Drug use: Not Currently    Sexual activity: Not Currently     Review of patient's allergies indicates:  No Known Allergies    The following were reviewed at this visit: active problem list, medication list, allergies, family history, social history, and health maintenance.    Medications:    Current Outpatient Medications:     folic acid (FOLVITE) 1 MG tablet, Take 1 tablet (1,000 mcg total) by mouth every morning., Disp: 30 tablet, Rfl: 5    hydrOXYchloroQUINE (PLAQUENIL) 200 mg tablet, Take 1 tablet (200 mg total) by mouth 2 (two) times daily., Disp: 60 tablet, Rfl: 5    ketoconazole (NIZORAL) 2 % cream, Apply topically once daily., Disp: 60 g, Rfl: 5    methotrexate 2.5 MG Tab, Take 6 tablets (15 mg total) by mouth every 7 days., Disp: 30 tablet, Rfl: 5      Medications have been reviewed and reconciled with patient at this visit.  Barriers to medications reviewed with patient.    Adverse reactions to current medications reviewed with patient..    Over the counter medications reviewed and reconciled with patient.  ROS        Objective:      Vitals:    08/02/23 0957   BP: 132/84   BP Location: Right arm   Patient Position: Sitting   BP Method: Small (Manual)   Pulse: 83   Resp: 16   SpO2: 96%   Weight: 88.9 kg  "(196 lb)   Height: 5' 10" (1.778 m)       Physical Exam          Assessment and Plan:       1. Primary hypertension  Assessment & Plan:  Controlled  Continue current medications               Follow up: Follow up in about 6 months (around 2/2/2024) for wellness, Labs check.        "

## 2023-10-23 NOTE — PROGRESS NOTES
"Subjective:           Patient ID: Fracisco Wynn is a 58 y.o. male.    Chief Complaint: Follow-up (Lab work results . )      Mr. Wynn is a 57-year-old male here for follow-up. Hx of cerebral palsy and ambulating with walker today. The patient completed 2 years of steroids for PMR.      Today he is reporting reporting joint pain involving the MCP PIP wrist elbow shoulders hips knees and ankles bilaterally but mostly in his hips. The pain is 1/10 in intensity dull in quality and continuous. That is associated with a morning stiffness lasting for less than 30 minutes. This has been associated with myalgias.  Muscle aches are 2/10 in intensity dull in quality and continuous. They are associated with fatigue.  Denies fever, chills, or recent infections.  He feels like his medications are all working well for him right now. Discussed the potential of decreasing steroid sparing medications in the future.         Review of Systems   Constitutional:  Negative for appetite change, chills and fever.   HENT:  Negative for congestion, ear pain, mouth sores, nosebleeds and trouble swallowing.    Eyes:  Negative for photophobia and discharge.   Respiratory:  Negative for chest tightness and shortness of breath.    Cardiovascular:  Negative for chest pain.   Gastrointestinal:  Negative for abdominal pain and vomiting.   Endocrine: Negative.    Genitourinary:  Negative for hematuria.   Musculoskeletal:         As per HPI   Skin:  Negative for rash.   Neurological:  Negative for weakness.         Objective:   BP (!) 157/72 (BP Location: Right arm, Patient Position: Sitting, BP Method: Medium (Automatic))   Pulse 103   Temp 98.5 °F (36.9 °C) (Oral)   Resp 18   Ht 5' 10" (1.778 m)   Wt 89.5 kg (197 lb 6.4 oz)   SpO2 95%   BMI 28.32 kg/m²          Physical Exam   Constitutional: He is oriented to person, place, and time. He appears well-developed and well-nourished. No distress.   HENT:   Head: Normocephalic and " atraumatic.   Right Ear: External ear normal.   Left Ear: External ear normal.   Eyes: Pupils are equal, round, and reactive to light.   Cardiovascular: Normal rate.   Pulmonary/Chest: Effort normal and breath sounds normal.   Abdominal: Soft. There is no abdominal tenderness.   Musculoskeletal:      Cervical back: Neck supple.      Comments: Left hand contracture   Lymphadenopathy:     He has no cervical adenopathy.   Neurological: He is alert and oriented to person, place, and time. He displays normal reflexes. No cranial nerve deficit or sensory deficit. He exhibits normal muscle tone. Coordination normal.   Skin: No rash noted. No erythema.   Vitals reviewed.       Completed Fibromyalgia exam 12/18 tender points.    No data to display     Assessment:         Medication List with Changes/Refills   Current Medications    FOLIC ACID (FOLVITE) 1 MG TABLET    Take 1 tablet (1,000 mcg total) by mouth every morning.       Start Date: 6/27/2023 End Date: --    HYDROXYCHLOROQUINE (PLAQUENIL) 200 MG TABLET    Take 1 tablet (200 mg total) by mouth 2 (two) times daily.       Start Date: 6/27/2023 End Date: --    KETOCONAZOLE (NIZORAL) 2 % CREAM    Apply topically once daily.       Start Date: 5/3/2023  End Date: --    METHOTREXATE 2.5 MG TAB    Take 6 tablets (15 mg total) by mouth every 7 days.       Start Date: 6/27/2023 End Date: --         ICD-10-CM ICD-9-CM   1. Polymyalgia rheumatica  M35.3 725   2. Primary hypertension  I10 401.9   3. Rheumatoid arthritis, involving unspecified site, unspecified whether rheumatoid factor present  M06.9 714.0   4. Cerebral palsy, unspecified type  G80.9 343.9   5. Impaired mobility  Z74.09 799.89             Plan:       1. Polymyalgia rheumatica  Overview:  The patient completed 2 years of steroids for PMR. Prednisone changed  to only PRN and continue the steroids sparing drugs    Assessment & Plan:  Completed 2 years of steroids- now on steroid sparing drugs.      2. Primary  hypertension  Assessment & Plan:  BP today is 157/72  Manual recheck:   Continue RX medications as prescribed by managing provider  Low Sodium Diet (Dash Diet - less than 2 grams of sodium per day).  Maintain healthy weight with goal BMI <30. Exercise 30 minutes per day 5 days per week.        3. Rheumatoid arthritis, involving unspecified site, unspecified whether rheumatoid factor present  Assessment & Plan:  Stable  Discussed the potential of decreasing steroid sparing medications in the future.    Continue methotrexate 15 mg Q 7 days  Continue folic acid daily  Continue hydroxychloroquine 200 mg b.i.d.     Patient reports he still mows his grass and uses stationary bike at home. He has not been taking the prednisone as needed because he has not needed it. He is happy with his current treatment     Labs ordered at last visit and reviewed with patient today during today's office visit.  He reports he is having wellness labs completed with PCP in the future and will see those labs for continued monitoring while on MTX    Plaquenil Eye Exam: [facility]- will follow up for an eye exam      4. Cerebral palsy, unspecified type  Assessment & Plan:  Per managing provider        5. Impaired mobility  Assessment & Plan:  Using walker for ambulation today               31 minutes of total time spent on the encounter, which includes face to face time and non-face to face time preparing to see the patient (eg, review of tests), Obtaining and/or reviewing separately obtained history, Documenting clinical information in the electronic or other health record, Independently interpreting results (not separately reported) and communicating results to the patient/family/caregiver, or Care coordination (not separately reported).

## 2023-10-24 ENCOUNTER — OFFICE VISIT (OUTPATIENT)
Dept: RHEUMATOLOGY | Facility: CLINIC | Age: 58
End: 2023-10-24
Payer: MEDICARE

## 2023-10-24 VITALS
DIASTOLIC BLOOD PRESSURE: 72 MMHG | TEMPERATURE: 99 F | BODY MASS INDEX: 28.26 KG/M2 | WEIGHT: 197.38 LBS | RESPIRATION RATE: 18 BRPM | SYSTOLIC BLOOD PRESSURE: 157 MMHG | HEIGHT: 70 IN | HEART RATE: 103 BPM | OXYGEN SATURATION: 95 %

## 2023-10-24 DIAGNOSIS — I10 PRIMARY HYPERTENSION: ICD-10-CM

## 2023-10-24 DIAGNOSIS — M35.3 POLYMYALGIA RHEUMATICA: Primary | ICD-10-CM

## 2023-10-24 DIAGNOSIS — G80.9 CEREBRAL PALSY, UNSPECIFIED TYPE: ICD-10-CM

## 2023-10-24 DIAGNOSIS — Z74.09 IMPAIRED MOBILITY: ICD-10-CM

## 2023-10-24 DIAGNOSIS — M06.9 RHEUMATOID ARTHRITIS, INVOLVING UNSPECIFIED SITE, UNSPECIFIED WHETHER RHEUMATOID FACTOR PRESENT: ICD-10-CM

## 2023-10-24 PROCEDURE — 99999 PR PBB SHADOW E&M-EST. PATIENT-LVL IV: ICD-10-PCS | Mod: PBBFAC,,,

## 2023-10-24 PROCEDURE — 99999 PR PBB SHADOW E&M-EST. PATIENT-LVL IV: CPT | Mod: PBBFAC,,,

## 2023-10-24 PROCEDURE — 99214 OFFICE O/P EST MOD 30 MIN: CPT | Mod: PBBFAC

## 2023-10-24 PROCEDURE — 99214 PR OFFICE/OUTPT VISIT, EST, LEVL IV, 30-39 MIN: ICD-10-PCS | Mod: S$PBB,,,

## 2023-10-24 PROCEDURE — 99214 OFFICE O/P EST MOD 30 MIN: CPT | Mod: S$PBB,,,

## 2023-10-24 NOTE — ASSESSMENT & PLAN NOTE
BP today is 157/72  Manual recheck:   Continue RX medications as prescribed by managing provider  Low Sodium Diet (Dash Diet - less than 2 grams of sodium per day).  Maintain healthy weight with goal BMI <30. Exercise 30 minutes per day 5 days per week.

## 2023-10-24 NOTE — ASSESSMENT & PLAN NOTE
Anamaria  Discussed the potential of decreasing steroid sparing medications in the future.    Continue methotrexate 15 mg Q 7 days  Continue folic acid daily  Continue hydroxychloroquine 200 mg b.i.d.     Patient reports he still mows his grass and uses stationary bike at home. He has not been taking the prednisone as needed because he has not needed it. He is happy with his current treatment     Labs ordered at last visit and reviewed with patient today during today's office visit.  He reports he is having wellness labs completed with PCP in the future and will see those labs for continued monitoring while on MTX    Plaquenil Eye Exam: [facility]- will follow up for an eye exam

## 2023-12-14 DIAGNOSIS — M35.3 POLYMYALGIA RHEUMATICA: Primary | ICD-10-CM

## 2024-01-05 ENCOUNTER — TELEPHONE (OUTPATIENT)
Dept: RHEUMATOLOGY | Facility: CLINIC | Age: 59
End: 2024-01-05
Payer: MEDICARE

## 2024-01-05 DIAGNOSIS — M35.3 POLYMYALGIA RHEUMATICA: ICD-10-CM

## 2024-01-05 DIAGNOSIS — M06.9 RHEUMATOID ARTHRITIS, INVOLVING UNSPECIFIED SITE, UNSPECIFIED WHETHER RHEUMATOID FACTOR PRESENT: ICD-10-CM

## 2024-01-05 RX ORDER — METHOTREXATE 2.5 MG/1
15 TABLET ORAL
Qty: 30 TABLET | Refills: 5 | Status: SHIPPED | OUTPATIENT
Start: 2024-01-05

## 2024-01-05 RX ORDER — HYDROXYCHLOROQUINE SULFATE 200 MG/1
200 TABLET, FILM COATED ORAL 2 TIMES DAILY
Qty: 60 TABLET | Refills: 5 | Status: SHIPPED | OUTPATIENT
Start: 2024-01-05

## 2024-01-05 RX ORDER — FOLIC ACID 1 MG/1
1000 TABLET ORAL EVERY MORNING
Qty: 30 TABLET | Refills: 5 | Status: SHIPPED | OUTPATIENT
Start: 2024-01-05

## 2024-01-05 NOTE — TELEPHONE ENCOUNTER
Patients pharmacy [facility] sent a refiill request for hydroxychloroquine 200mg. Please Advise. Thanks

## 2024-01-05 NOTE — TELEPHONE ENCOUNTER
He does not have a future appointment? Looks like he was referred to Dr. Denney in December but does not have an appointment yet. I will refill his medications. He is also due for blood work while being on the methotrexate it is important to have routine lab monitoring. I have refilled his medications. I have ordered blood work for him to complete.  Thanks

## 2024-01-11 ENCOUNTER — LAB VISIT (OUTPATIENT)
Dept: LAB | Facility: HOSPITAL | Age: 59
End: 2024-01-11
Payer: MEDICARE

## 2024-01-11 ENCOUNTER — TELEPHONE (OUTPATIENT)
Dept: RHEUMATOLOGY | Facility: CLINIC | Age: 59
End: 2024-01-11
Payer: MEDICARE

## 2024-01-11 DIAGNOSIS — M06.9 RHEUMATOID ARTHRITIS, INVOLVING UNSPECIFIED SITE, UNSPECIFIED WHETHER RHEUMATOID FACTOR PRESENT: ICD-10-CM

## 2024-01-11 LAB
ALBUMIN SERPL-MCNC: 4.4 G/DL (ref 3.5–5)
ALBUMIN/GLOB SERPL: 1.6 RATIO (ref 1.1–2)
ALP SERPL-CCNC: 61 UNIT/L (ref 40–150)
ALT SERPL-CCNC: 52 UNIT/L (ref 0–55)
AST SERPL-CCNC: 27 UNIT/L (ref 5–34)
BASOPHILS # BLD AUTO: 0.07 X10(3)/MCL
BASOPHILS NFR BLD AUTO: 1.2 %
BILIRUB SERPL-MCNC: 0.8 MG/DL
BUN SERPL-MCNC: 12.3 MG/DL (ref 8.4–25.7)
CALCIUM SERPL-MCNC: 9.4 MG/DL (ref 8.4–10.2)
CHLORIDE SERPL-SCNC: 106 MMOL/L (ref 98–107)
CO2 SERPL-SCNC: 26 MMOL/L (ref 22–29)
CREAT SERPL-MCNC: 0.76 MG/DL (ref 0.73–1.18)
CRP SERPL-MCNC: <1 MG/L
EOSINOPHIL # BLD AUTO: 0.09 X10(3)/MCL (ref 0–0.9)
EOSINOPHIL NFR BLD AUTO: 1.5 %
ERYTHROCYTE [DISTWIDTH] IN BLOOD BY AUTOMATED COUNT: 13.2 % (ref 11.5–17)
ERYTHROCYTE [SEDIMENTATION RATE] IN BLOOD: 4 MM/HR (ref 0–15)
GFR SERPLBLD CREATININE-BSD FMLA CKD-EPI: >60 MLS/MIN/1.73/M2
GLOBULIN SER-MCNC: 2.7 GM/DL (ref 2.4–3.5)
GLUCOSE SERPL-MCNC: 86 MG/DL (ref 74–100)
HCT VFR BLD AUTO: 48.1 % (ref 42–52)
HGB BLD-MCNC: 16.5 G/DL (ref 14–18)
IMM GRANULOCYTES # BLD AUTO: 0.01 X10(3)/MCL (ref 0–0.04)
IMM GRANULOCYTES NFR BLD AUTO: 0.2 %
LYMPHOCYTES # BLD AUTO: 1.87 X10(3)/MCL (ref 0.6–4.6)
LYMPHOCYTES NFR BLD AUTO: 30.8 %
MCH RBC QN AUTO: 31.2 PG (ref 27–31)
MCHC RBC AUTO-ENTMCNC: 34.3 G/DL (ref 33–36)
MCV RBC AUTO: 90.9 FL (ref 80–94)
MONOCYTES # BLD AUTO: 0.73 X10(3)/MCL (ref 0.1–1.3)
MONOCYTES NFR BLD AUTO: 12 %
NEUTROPHILS # BLD AUTO: 3.31 X10(3)/MCL (ref 2.1–9.2)
NEUTROPHILS NFR BLD AUTO: 54.3 %
NRBC BLD AUTO-RTO: 0 %
PLATELET # BLD AUTO: 221 X10(3)/MCL (ref 130–400)
PMV BLD AUTO: 9.8 FL (ref 7.4–10.4)
POTASSIUM SERPL-SCNC: 4.5 MMOL/L (ref 3.5–5.1)
PROT SERPL-MCNC: 7.1 GM/DL (ref 6.4–8.3)
RBC # BLD AUTO: 5.29 X10(6)/MCL (ref 4.7–6.1)
SODIUM SERPL-SCNC: 140 MMOL/L (ref 136–145)
WBC # SPEC AUTO: 6.08 X10(3)/MCL (ref 4.5–11.5)

## 2024-01-11 PROCEDURE — 36415 COLL VENOUS BLD VENIPUNCTURE: CPT

## 2024-01-11 PROCEDURE — 86140 C-REACTIVE PROTEIN: CPT

## 2024-01-11 PROCEDURE — 80053 COMPREHEN METABOLIC PANEL: CPT

## 2024-01-11 PROCEDURE — 85652 RBC SED RATE AUTOMATED: CPT

## 2024-01-11 PROCEDURE — 85025 COMPLETE CBC W/AUTO DIFF WBC: CPT

## 2024-01-11 NOTE — TELEPHONE ENCOUNTER
Results reviewed.Please let him know that all of his labs are completely normal. He can continue his prescribed medication including the methotrexate.

## 2024-01-19 ENCOUNTER — TELEPHONE (OUTPATIENT)
Dept: RHEUMATOLOGY | Facility: CLINIC | Age: 59
End: 2024-01-19
Payer: MEDICARE

## 2024-01-19 DIAGNOSIS — M06.9 RHEUMATOID ARTHRITIS, INVOLVING UNSPECIFIED SITE, UNSPECIFIED WHETHER RHEUMATOID FACTOR PRESENT: Primary | ICD-10-CM

## 2024-01-19 DIAGNOSIS — Z79.899 LONG-TERM USE OF PLAQUENIL: ICD-10-CM

## 2024-01-19 DIAGNOSIS — M35.3 POLYMYALGIA RHEUMATICA: Primary | ICD-10-CM

## 2024-01-19 NOTE — TELEPHONE ENCOUNTER
Referral ordered- please fax to [facility] for plaquenil eye exam. (I put that on the referral)  thanks

## 2024-01-19 NOTE — TELEPHONE ENCOUNTER
Patient's mother called stating that the patient was told on his last visit   That he will need to see a eye dr. He did go to [facility] and they are   Needing a referral .  Please Advise. Thanks

## 2024-01-26 DIAGNOSIS — Z13.220 ENCOUNTER FOR LIPID SCREENING FOR CARDIOVASCULAR DISEASE: ICD-10-CM

## 2024-01-26 DIAGNOSIS — Z13.6 ENCOUNTER FOR LIPID SCREENING FOR CARDIOVASCULAR DISEASE: ICD-10-CM

## 2024-01-26 DIAGNOSIS — I10 PRIMARY HYPERTENSION: ICD-10-CM

## 2024-01-26 DIAGNOSIS — Z00.00 WELL ADULT EXAM: Primary | ICD-10-CM

## 2024-01-29 ENCOUNTER — LAB VISIT (OUTPATIENT)
Dept: LAB | Facility: HOSPITAL | Age: 59
End: 2024-01-29
Attending: STUDENT IN AN ORGANIZED HEALTH CARE EDUCATION/TRAINING PROGRAM
Payer: MEDICARE

## 2024-01-29 DIAGNOSIS — I10 PRIMARY HYPERTENSION: ICD-10-CM

## 2024-01-29 DIAGNOSIS — Z13.220 ENCOUNTER FOR LIPID SCREENING FOR CARDIOVASCULAR DISEASE: ICD-10-CM

## 2024-01-29 DIAGNOSIS — Z00.00 WELL ADULT EXAM: ICD-10-CM

## 2024-01-29 DIAGNOSIS — Z13.6 ENCOUNTER FOR LIPID SCREENING FOR CARDIOVASCULAR DISEASE: ICD-10-CM

## 2024-01-29 LAB
CHOLEST SERPL-MCNC: 172 MG/DL
CHOLEST/HDLC SERPL: 4 {RATIO} (ref 0–5)
HDLC SERPL-MCNC: 40 MG/DL (ref 35–60)
LDLC SERPL CALC-MCNC: 98 MG/DL (ref 50–140)
TRIGL SERPL-MCNC: 171 MG/DL (ref 34–140)
VLDLC SERPL CALC-MCNC: 34 MG/DL

## 2024-01-29 PROCEDURE — 36415 COLL VENOUS BLD VENIPUNCTURE: CPT

## 2024-01-29 PROCEDURE — 80061 LIPID PANEL: CPT

## 2024-02-02 ENCOUNTER — OFFICE VISIT (OUTPATIENT)
Dept: INTERNAL MEDICINE | Facility: CLINIC | Age: 59
End: 2024-02-02
Payer: MEDICARE

## 2024-02-02 VITALS
BODY MASS INDEX: 27.2 KG/M2 | WEIGHT: 190 LBS | SYSTOLIC BLOOD PRESSURE: 138 MMHG | HEART RATE: 102 BPM | DIASTOLIC BLOOD PRESSURE: 80 MMHG | OXYGEN SATURATION: 98 % | HEIGHT: 70 IN

## 2024-02-02 DIAGNOSIS — M35.3 POLYMYALGIA RHEUMATICA: ICD-10-CM

## 2024-02-02 DIAGNOSIS — D84.821 DRUG-INDUCED IMMUNODEFICIENCY: Primary | ICD-10-CM

## 2024-02-02 DIAGNOSIS — Z00.00 MEDICARE ANNUAL WELLNESS VISIT, SUBSEQUENT: ICD-10-CM

## 2024-02-02 DIAGNOSIS — Z79.899 DRUG-INDUCED IMMUNODEFICIENCY: Primary | ICD-10-CM

## 2024-02-02 DIAGNOSIS — G80.9 CEREBRAL PALSY, UNSPECIFIED TYPE: ICD-10-CM

## 2024-02-02 DIAGNOSIS — M06.9 RHEUMATOID ARTHRITIS, INVOLVING UNSPECIFIED SITE, UNSPECIFIED WHETHER RHEUMATOID FACTOR PRESENT: ICD-10-CM

## 2024-02-02 PROBLEM — E66.01 MORBID OBESITY: Status: ACTIVE | Noted: 2024-02-02

## 2024-02-02 PROCEDURE — G0439 PPPS, SUBSEQ VISIT: HCPCS | Mod: ,,, | Performed by: STUDENT IN AN ORGANIZED HEALTH CARE EDUCATION/TRAINING PROGRAM

## 2024-02-05 PROBLEM — E66.01 MORBID OBESITY: Status: RESOLVED | Noted: 2024-02-02 | Resolved: 2024-02-05

## 2024-02-05 NOTE — PROGRESS NOTES
Subjective:      Fracisco Wynn  02/05/2024  [id number]    Madalyn Rausch MD  Patient Care Team:  Madalyn Rausch MD as PCP - General (Internal Medicine)          Visit Type:a scheduled routine follow-up visit    Chief Complaint: Medicare AWV    HPI  Mr Yap presents for Medicare wellness visit. Patient does not have any complaints, doing well.     Past Medical History:   Diagnosis Date    Cerebral palsy, unspecified     Polymyalgia rheumatica     Rheumatoid arthritis, unspecified      Past Surgical History:   Procedure Laterality Date    CYST REMOVAL Right     right shoulder    FOOT SURGERY Bilateral     HERNIA REPAIR       History reviewed. No pertinent family history.  Social History     Tobacco Use    Smoking status: Never     Passive exposure: Never    Smokeless tobacco: Never   Substance and Sexual Activity    Alcohol use: Not Currently    Drug use: Not Currently    Sexual activity: Not Currently     Active Problem List with Overview Notes    Diagnosis Date Noted    Drug-induced immunodeficiency 02/02/2024    Impaired mobility 10/24/2023    Medicare annual wellness visit, subsequent 02/01/2023    Polymyalgia rheumatica 06/06/2022     The patient completed 2 years of steroids for PMR. Prednisone changed  to only PRN and continue the steroids sparing drugs      Rheumatoid arthritis 06/06/2022    Hypertension 06/06/2022    Cerebral palsy 06/06/2022     Review of patient's allergies indicates:  No Known Allergies    The following were reviewed at this visit: active problem list, medication list, allergies, family history, social history, and health maintenance.    Medications:    Current Outpatient Medications:     folic acid (FOLVITE) 1 MG tablet, Take 1 tablet (1,000 mcg total) by mouth every morning., Disp: 30 tablet, Rfl: 5    hydroxychloroquine (PLAQUENIL) 200 mg tablet, Take 1 tablet (200 mg total) by mouth 2 (two) times daily., Disp: 60 tablet, Rfl: 5    ketoconazole (NIZORAL) 2 %  cream, Apply topically once daily., Disp: 60 g, Rfl: 5    methotrexate 2.5 MG Tab, Take 6 tablets (15 mg total) by mouth every 7 days., Disp: 30 tablet, Rfl: 5      Medications have been reviewed and reconciled with patient at this visit.  Barriers to medications reviewed with patient.    Adverse reactions to current medications reviewed with patient..    Over the counter medications reviewed and reconciled with patient.    Opioid Screening: Patient medication list reviewed, patient is not taking prescription opioids. Patient is not using additional opioids than prescribed. Patient is at low risk of substance abuse based on this opioid use history.     Review of Systems   Constitutional:  Negative for chills, diaphoresis, fever and weight loss.   HENT:  Negative for congestion, ear discharge, sinus pain and sore throat.    Eyes:  Negative for photophobia.   Respiratory:  Negative for cough, hemoptysis and shortness of breath.    Cardiovascular:  Negative for chest pain, palpitations, claudication, leg swelling and PND.   Gastrointestinal:  Negative for constipation, diarrhea, nausea and vomiting.   Genitourinary:  Negative for dysuria, frequency and urgency.   Musculoskeletal:  Negative for back pain, joint pain, myalgias and neck pain.   Skin:  Negative for itching and rash.   Neurological:  Negative for dizziness, focal weakness and headaches.   Psychiatric/Behavioral:  Negative for depression. The patient does not have insomnia.        Are you male or female?: Male  During the past four weeks, how much have you been bothered by emotional problems such as feeling anxious, depressed, irritable, sad, or downhearted and blue?: Not at all  During the past five weeks, has your physical and/or emotional health limited your social activities with family, friends, neighbors, or groups?: Not at all  During the past four weeks, how much bodily pain have you generally had?: Mild pain  During the past four weeks, was someone  available to help if you needed and wanted help?: Yes, as much as I wanted  During the past four weeks, what was the hardest physical activity you could do for at least two minutes?: Moderate  Can you get to places out of walking distance without help?  (For example, can you travel alone on buses or taxis, or drive your own car?): No  Can you go shopping for groceries or clothes without someone's help?: No  Can you prepare your own meals?: No  Can you do your own housework without help?: No  Because of any health problems, do you need the help of another person with your personal care needs such as eating, bathing, dressing, or getting around the house?: Yes  Can you handle your own money without help?: No  During the past four weeks, how would you rate your health in general?: Excellent  How have things been going for you during the past four weeks?: Pretty well  Are you having difficulties driving your car?: Yes, often  Do you always fasten your seat belt when you are in a car?: Yes, usually  How often in the past four weeks have you been bothered by falling or dizzy when standing up?: Never  How often in the past four weeks have you been bothered by sexual problems?: Never  How often in the past four weeks have you been bothered by trouble eating well?: Never  How often in the past four weeks have you been bothered by teeth or denture problems?: Never  How often in the past four weeks have you been bothered with problems using the telephone?: Never  How often in the past four weeks have you been bothered by tiredness or fatigue?: Never  Have you fallen two or more times in the past year?: No  Are you afraid of falling?: No  Are you a smoker?: No  During the past four weeks, how many drinks of wine, beer, or other alcoholic beverages did you have?: No alcohol at all  Do you exercise for about 20 minutes three or more days a week?: Yes, most of the time  Have you been given any information to help you with hazards  "in your house that might hurt you?: Yes  Have you been given any information to help you with keeping track of your medications?: Yes  How often do you have trouble taking medicines the way you've been told to take them?: I always take them as prescribed  How confident are you that you can control and manage most of your health problems?: Very confident  What is your race? (Check all that apply.):           Objective:      Vitals:    02/02/24 1013   BP: 138/80   Pulse: 102   SpO2: 98%   Weight: 86.2 kg (190 lb)   Height: 5' 10" (1.778 m)       Physical Exam  Constitutional:       Appearance: Normal appearance.   HENT:      Head: Normocephalic and atraumatic.   Cardiovascular:      Rate and Rhythm: Normal rate and regular rhythm.      Pulses: Normal pulses.   Pulmonary:      Effort: Pulmonary effort is normal.      Breath sounds: Normal breath sounds.   Abdominal:      General: Abdomen is flat. Bowel sounds are normal. There is no distension.      Palpations: Abdomen is soft.      Tenderness: There is no abdominal tenderness.   Musculoskeletal:         General: No tenderness. Normal range of motion.      Right lower leg: No edema.      Left lower leg: No edema.   Lymphadenopathy:      Cervical: No cervical adenopathy.   Neurological:      General: No focal deficit present.      Mental Status: He is alert and oriented to person, place, and time.              No data to display                  2/2/2024    10:00 AM 10/24/2023     9:30 AM 8/2/2023    10:00 AM 6/27/2023     2:00 PM 2/24/2023     8:30 AM 2/1/2023    10:00 AM 10/6/2022     8:30 AM   Fall Risk Assessment - Outpatient   Mobility Status Ambulatory w/ assistance Ambulatory Ambulatory Ambulatory w/ assistance Ambulatory w/ assistance Ambulatory w/ assistance Ambulatory   Number of falls 0 0 0 0 0 0 0   Identified as fall risk True False False True True True False                 Depression Screening  Over the past two weeks, has the patient felt down, " depressed, or hopeless?: No  Over the past two weeks, has the patient felt little interest or pleasure in doing things?: No  Functional Ability/Safety Screening  Was the patient's timed Up & Go test unsteady or longer than 30 seconds?: No  Does the patient need help with phone, transportation, shopping, preparing meals, housework, laundry, meds, or managing money?: Yes  Does the patient's home have rugs in the hallway, lack grab bars in the bathroom, lack handrails on the stairs or have poor lighting?: No  Have you noticed any hearing difficulties?: No  Cognitive Function (Assessed through direct observation with due consideration of information obtained by way of patient reports and/or concerns raised by family, friends, caretakers, or others)    Does the patient repeat questions/statements in the same day?: No  Does the patient have trouble remembering the date, year, and time?: No  Does the patient have difficulty managing finances?: No  Does the patient have a decreased sense of direction?: No        Laboratory Reviewed ({Yes)  Lab Results   Component Value Date    WBC 6.08 01/11/2024    HGB 16.5 01/11/2024    HCT 48.1 01/11/2024     01/11/2024    CHOL 172 01/29/2024    TRIG 171 (H) 01/29/2024    HDL 40 01/29/2024    ALT 52 01/11/2024    AST 27 01/11/2024     01/11/2024    K 4.5 01/11/2024    CREATININE 0.76 01/11/2024    BUN 12.3 01/11/2024    CO2 26 01/11/2024    TSH 0.5984 02/10/2021         Assessment and Plan:       Problem List Items Addressed This Visit          Neuro    Cerebral palsy     Stable  Independent on activities of daily life               Immunology/Multi System    Polymyalgia rheumatica     Stable on hydroxychloroquine and MTX  Continue          Rheumatoid arthritis     Stable on hydroxychloroquine and MTX         Drug-induced immunodeficiency - Primary     Stable  On MTX for RA and IA            Other    Medicare annual wellness visit, subsequent     Colonoscopy: up to  date  Labs: done and reviewed with patient               Care Plan/Goals: Reviewed    Goals    None         Follow up: Follow up in about 6 months (around 8/2/2024) for Bp follow up.    No orders of the defined types were placed in this encounter.      Medicare Annual Wellness and Personalized Prevention Plan:   Fall Risk + Home Safety + Hearing Impairment + Depression Screen + Cognitive Impairment Screen + Health Risk Assessment all reviewed.     Health Maintenance Topics with due status: Not Due       Topic Last Completion Date    Colorectal Cancer Screening 12/20/2018    Lipid Panel 01/29/2024      The patient's Health Maintenance was reviewed and the following appears to be due at this time:   Health Maintenance Due   Topic Date Due    PROSTATE-SPECIFIC ANTIGEN  Never done    Hepatitis C Screening  Never done    Pneumococcal Vaccines (Age 0-64) (1 of 2 - PCV) Never done    HIV Screening  Never done    TETANUS VACCINE  Never done    Hemoglobin A1c (Diabetic Prevention Screening)  Never done    COVID-19 Vaccine (4 - 2023-24 season) 09/01/2023       Advance Care Planning   I attest to discussing Advance Care Planning with patient and/or family member.  Education was provided including the importance of the Health Care Power of , Advance Directives, and/or [place] documentation.  The patient expressed understanding to the importance of this information and discussion.

## 2024-02-27 NOTE — PROGRESS NOTES
Subjective:           Patient ID: Fracisco Wynn is a 58 y.o. male.    Chief Complaint: Follow-up (Follow up/Would like to taper down on the mtx to 3 tablets)      Mr. Wynn is a 57-year-old male here for follow-up. Hx of cerebral palsy and ambulating with walker today. The patient completed 2 years of steroids for PMR. He lives by himself and independent with his activities and ADL's.  He does stationary bike at home 4 times per week.     Denies history of fevers, rashes, photosensitivity, oral or nasal ulcers, h/o MI, stroke, seizures, h/o PE or DVT, Raynaud's phenomenon, uveitis, malignancies.      Family history of autoimmune disease: None  Smoking: Nonsmoker       Today Feb 28, 2024: Continues on steroid sparing meds for PMR: MTX 15mg weekly and  mg BID. Feeling great. No flares. No complaints. Denies any joint pain. Continues to be active with stationary bike and is independent with his activities. He feels like his medications are all working well for him right now and would like to discuss decreasing the methotrexate. No concerns today. Denies any infections since last office visit. BP is elevated today- denies H/A reports he gets nervous when he goes to doctors offices.       Review of Systems   Constitutional:  Negative for appetite change, chills and fever.   HENT:  Negative for congestion, ear pain, mouth sores, nosebleeds and trouble swallowing.    Eyes:  Negative for photophobia and discharge.   Respiratory:  Negative for chest tightness and shortness of breath.    Cardiovascular:  Negative for chest pain.   Gastrointestinal:  Negative for abdominal pain and vomiting.   Endocrine: Negative.    Genitourinary:  Negative for hematuria.   Musculoskeletal:         As per HPI   Skin:  Negative for rash.   Neurological:  Negative for weakness.         Objective:   BP (!) 160/100 (BP Location: Right arm, Patient Position: Sitting, BP Method: Large (Manual))   Pulse 100   Temp 98.2 °F (36.8 °C)  "(Oral)   Resp 18   Ht 5' 10" (1.778 m)   Wt 88.8 kg (195 lb 12.8 oz)   SpO2 97%   BMI 28.09 kg/m²          Physical Exam   Constitutional: He is oriented to person, place, and time. He appears well-developed and well-nourished. No distress.   HENT:   Head: Normocephalic and atraumatic.   Right Ear: External ear normal.   Left Ear: External ear normal.   Eyes: Pupils are equal, round, and reactive to light.   Cardiovascular: Normal rate.   Pulmonary/Chest: Effort normal and breath sounds normal.   Abdominal: Soft. There is no abdominal tenderness.   Musculoskeletal:      Cervical back: Neck supple.      Comments: Left hand contracture but is able to open it   Lymphadenopathy:     He has no cervical adenopathy.   Neurological: He is alert and oriented to person, place, and time. He displays normal reflexes. No cranial nerve deficit or sensory deficit. He exhibits normal muscle tone. Coordination normal.   Skin: No rash noted. No erythema.   Vitals reviewed.       No data to display     Assessment:         Medication List with Changes/Refills   Current Medications    KETOCONAZOLE (NIZORAL) 2 % CREAM    Apply topically once daily.       Start Date: 5/3/2023  End Date: --   Changed and/or Refilled Medications    Modified Medication Previous Medication    FOLIC ACID (FOLVITE) 1 MG TABLET folic acid (FOLVITE) 1 MG tablet       Take 1 tablet (1,000 mcg total) by mouth every morning.    Take 1 tablet (1,000 mcg total) by mouth every morning.       Start Date: 2/28/2024 End Date: --    Start Date: 1/5/2024  End Date: 2/28/2024    HYDROXYCHLOROQUINE (PLAQUENIL) 200 MG TABLET hydroxychloroquine (PLAQUENIL) 200 mg tablet       Take 1 tablet (200 mg total) by mouth 2 (two) times daily.    Take 1 tablet (200 mg total) by mouth 2 (two) times daily.       Start Date: 2/28/2024 End Date: --    Start Date: 1/5/2024  End Date: 2/28/2024    METHOTREXATE 2.5 MG TAB methotrexate 2.5 MG Tab       Take 3 tablets (7.5 mg total) by mouth " every 7 days.    Take 6 tablets (15 mg total) by mouth every 7 days.       Start Date: 2/28/2024 End Date: --    Start Date: 1/5/2024  End Date: 2/28/2024         ICD-10-CM ICD-9-CM   1. Polymyalgia rheumatica  M35.3 725   2. Rheumatoid arthritis, involving unspecified site, unspecified whether rheumatoid factor present  M06.9 714.0   3. Drug-induced immunodeficiency  D84.821 279.3    Z79.899 E947.9   4. Impaired mobility  Z74.09 799.89   5. Cerebral palsy, unspecified type  G80.9 343.9   6. Primary hypertension  I10 401.9               Plan:       1. Polymyalgia rheumatica  Overview:  The patient completed 2 years of steroids for PMR. Prednisone changed  to only PRN and continue the steroids sparing drugs    Assessment & Plan:  Completed 2 years of steroids- now on steroid sparing drugs.       Orders:  -     methotrexate 2.5 MG Tab; Take 3 tablets (7.5 mg total) by mouth every 7 days.  Dispense: 15 tablet; Refill: 5  -     folic acid (FOLVITE) 1 MG tablet; Take 1 tablet (1,000 mcg total) by mouth every morning.  Dispense: 30 tablet; Refill: 5  -     hydroxychloroquine (PLAQUENIL) 200 mg tablet; Take 1 tablet (200 mg total) by mouth 2 (two) times daily.  Dispense: 60 tablet; Refill: 5  -     Comprehensive Metabolic Panel; Future; Expected date: 04/10/2024  -     CBC Auto Differential; Future; Expected date: 04/10/2024    2. Rheumatoid arthritis, involving unspecified site, unspecified whether rheumatoid factor present  Assessment & Plan:  Stable  Denies joint pain and any flares. Has not needed any PRN prednisone. We discussed slowly tapering medication and monitoring on reduced dose of MTX. Instructed to call with any symptoms or joint pain.    Decrease methotrexate from 15 mg to 7.5 mg Q 7 days  Continue folic acid daily  Continue hydroxychloroquine 200 mg b.i.d.    Plaquenil Eye Exam: [facility]- will follow up for an eye exam    Labs completed with PCP last month and reviewed today with the patient  Labs  every 3 months while on MTX. CBC and CMP ordered to be completed in about 6 weeks. Patient aware         Orders:  -     methotrexate 2.5 MG Tab; Take 3 tablets (7.5 mg total) by mouth every 7 days.  Dispense: 15 tablet; Refill: 5  -     folic acid (FOLVITE) 1 MG tablet; Take 1 tablet (1,000 mcg total) by mouth every morning.  Dispense: 30 tablet; Refill: 5  -     hydroxychloroquine (PLAQUENIL) 200 mg tablet; Take 1 tablet (200 mg total) by mouth 2 (two) times daily.  Dispense: 60 tablet; Refill: 5  -     Comprehensive Metabolic Panel; Future; Expected date: 04/10/2024  -     CBC Auto Differential; Future; Expected date: 04/10/2024    3. Drug-induced immunodeficiency  Assessment & Plan:  Compromised immune system s/t autoimmune disease and use of immunosuppressive medications. (MTX )  - Monitor carefully for infections and toxicities.   - Advised strict adherence to age appropriate vaccinations and cancer screenings- including yearly skin exams  - Advised to get immediate medical care if any infection.   - hold MTX with infections        4. Impaired mobility  Assessment & Plan:  Using walker for ambulation today       5. Cerebral palsy, unspecified type    6. Primary hypertension  Assessment & Plan:  BP today is 164/94  Manual recheck: 160/100  Denies HA/Blurry Vision- reports he is nervous when he comes to doctors appointments.                     31 minutes of total time spent on the encounter, which includes face to face time and non-face to face time preparing to see the patient (eg, review of tests), Obtaining and/or reviewing separately obtained history, Documenting clinical information in the electronic or other health record, Independently interpreting results (not separately reported) and communicating results to the patient/family/caregiver, or Care coordination (not separately reported).

## 2024-02-28 ENCOUNTER — OFFICE VISIT (OUTPATIENT)
Dept: RHEUMATOLOGY | Facility: CLINIC | Age: 59
End: 2024-02-28
Payer: MEDICARE

## 2024-02-28 VITALS
RESPIRATION RATE: 18 BRPM | OXYGEN SATURATION: 97 % | BODY MASS INDEX: 28.03 KG/M2 | TEMPERATURE: 98 F | DIASTOLIC BLOOD PRESSURE: 100 MMHG | HEIGHT: 70 IN | SYSTOLIC BLOOD PRESSURE: 160 MMHG | WEIGHT: 195.81 LBS | HEART RATE: 100 BPM

## 2024-02-28 DIAGNOSIS — Z74.09 IMPAIRED MOBILITY: ICD-10-CM

## 2024-02-28 DIAGNOSIS — D84.821 DRUG-INDUCED IMMUNODEFICIENCY: ICD-10-CM

## 2024-02-28 DIAGNOSIS — I10 PRIMARY HYPERTENSION: ICD-10-CM

## 2024-02-28 DIAGNOSIS — M06.9 RHEUMATOID ARTHRITIS, INVOLVING UNSPECIFIED SITE, UNSPECIFIED WHETHER RHEUMATOID FACTOR PRESENT: ICD-10-CM

## 2024-02-28 DIAGNOSIS — Z79.899 DRUG-INDUCED IMMUNODEFICIENCY: ICD-10-CM

## 2024-02-28 DIAGNOSIS — G80.9 CEREBRAL PALSY, UNSPECIFIED TYPE: ICD-10-CM

## 2024-02-28 DIAGNOSIS — M35.3 POLYMYALGIA RHEUMATICA: Primary | ICD-10-CM

## 2024-02-28 PROCEDURE — 99214 OFFICE O/P EST MOD 30 MIN: CPT | Mod: PBBFAC

## 2024-02-28 PROCEDURE — 99999 PR PBB SHADOW E&M-EST. PATIENT-LVL IV: CPT | Mod: PBBFAC,,,

## 2024-02-28 PROCEDURE — 99214 OFFICE O/P EST MOD 30 MIN: CPT | Mod: S$PBB,,,

## 2024-02-28 RX ORDER — METHOTREXATE 2.5 MG/1
7.5 TABLET ORAL
Qty: 15 TABLET | Refills: 5 | Status: SHIPPED | OUTPATIENT
Start: 2024-02-28

## 2024-02-28 RX ORDER — HYDROXYCHLOROQUINE SULFATE 200 MG/1
200 TABLET, FILM COATED ORAL 2 TIMES DAILY
Qty: 60 TABLET | Refills: 5 | Status: SHIPPED | OUTPATIENT
Start: 2024-02-28

## 2024-02-28 RX ORDER — FOLIC ACID 1 MG/1
1000 TABLET ORAL EVERY MORNING
Qty: 30 TABLET | Refills: 5 | Status: SHIPPED | OUTPATIENT
Start: 2024-02-28

## 2024-02-28 NOTE — ASSESSMENT & PLAN NOTE
Stable  Denies joint pain and any flares. Has not needed any PRN prednisone. We discussed slowly tapering medication and monitoring on reduced dose of MTX. Instructed to call with any symptoms or joint pain.    Decrease methotrexate from 15 mg to 7.5 mg Q 7 days  Continue folic acid daily  Continue hydroxychloroquine 200 mg b.i.d.    Plaquenil Eye Exam: [facility]- will follow up for an eye exam    Labs completed with PCP last month and reviewed today with the patient  Labs every 3 months while on MTX. CBC and CMP ordered to be completed in about 6 weeks. Patient aware

## 2024-02-29 NOTE — ASSESSMENT & PLAN NOTE
BP today is 164/94  Manual recheck: 160/100  Denies HA/Blurry Vision- reports he is nervous when he comes to doctors appointments.

## 2024-02-29 NOTE — ASSESSMENT & PLAN NOTE
Compromised immune system s/t autoimmune disease and use of immunosuppressive medications. (MTX )  - Monitor carefully for infections and toxicities.   - Advised strict adherence to age appropriate vaccinations and cancer screenings- including yearly skin exams  - Advised to get immediate medical care if any infection.   - hold MTX with infections

## 2024-04-11 ENCOUNTER — TELEPHONE (OUTPATIENT)
Dept: RHEUMATOLOGY | Facility: CLINIC | Age: 59
End: 2024-04-11
Payer: MEDICARE

## 2024-04-11 NOTE — TELEPHONE ENCOUNTER
Please call the patient to remind them to do labs in the next 2 weeks while he is on the methotrexate. The labs are already ordered.

## 2024-04-11 NOTE — TELEPHONE ENCOUNTER
Called patient no answer. Left a detailed vm and advised the patient   To call the office if he has any further questions.

## 2024-04-11 NOTE — TELEPHONE ENCOUNTER
----- Message from JOSÉ LUIS Pearce sent at 1/11/2024  1:06 PM CST -----  He is on MTX and needs labs checked every 3 months. Labs normal 1/11/24. Repeat in 3 months in April     Discussed with POA and several family members this afternoon. I explained them patient's current medical conditions. They want \"comfort measures\" but POA wants to continue PPN for nutrition and current medications. They don't want further work up with labs or any other diagnostic test. They don't want curative treatment. Will discussed with  for placement options.

## 2024-04-12 NOTE — TELEPHONE ENCOUNTER
3rd attempt to call patient no answer left a detailed vm .   I will set another reminder to call on next week .

## 2024-04-16 ENCOUNTER — LAB VISIT (OUTPATIENT)
Dept: LAB | Facility: HOSPITAL | Age: 59
End: 2024-04-16
Payer: MEDICARE

## 2024-04-16 ENCOUNTER — TELEPHONE (OUTPATIENT)
Dept: RHEUMATOLOGY | Facility: CLINIC | Age: 59
End: 2024-04-16
Payer: MEDICARE

## 2024-04-16 DIAGNOSIS — M35.3 POLYMYALGIA RHEUMATICA: ICD-10-CM

## 2024-04-16 DIAGNOSIS — M06.9 RHEUMATOID ARTHRITIS, INVOLVING UNSPECIFIED SITE, UNSPECIFIED WHETHER RHEUMATOID FACTOR PRESENT: ICD-10-CM

## 2024-04-16 LAB
ALBUMIN SERPL-MCNC: 4.4 G/DL (ref 3.5–5)
ALBUMIN/GLOB SERPL: 1.8 RATIO (ref 1.1–2)
ALP SERPL-CCNC: 61 UNIT/L (ref 40–150)
ALT SERPL-CCNC: 36 UNIT/L (ref 0–55)
AST SERPL-CCNC: 23 UNIT/L (ref 5–34)
BASOPHILS # BLD AUTO: 0.08 X10(3)/MCL
BASOPHILS NFR BLD AUTO: 1.5 %
BILIRUB SERPL-MCNC: 1 MG/DL
BUN SERPL-MCNC: 18 MG/DL (ref 8.4–25.7)
CALCIUM SERPL-MCNC: 9.8 MG/DL (ref 8.4–10.2)
CHLORIDE SERPL-SCNC: 107 MMOL/L (ref 98–107)
CO2 SERPL-SCNC: 27 MMOL/L (ref 22–29)
CREAT SERPL-MCNC: 0.84 MG/DL (ref 0.73–1.18)
EOSINOPHIL # BLD AUTO: 0.08 X10(3)/MCL (ref 0–0.9)
EOSINOPHIL NFR BLD AUTO: 1.5 %
ERYTHROCYTE [DISTWIDTH] IN BLOOD BY AUTOMATED COUNT: 12.8 % (ref 11.5–17)
GFR SERPLBLD CREATININE-BSD FMLA CKD-EPI: >60 MLS/MIN/1.73/M2
GLOBULIN SER-MCNC: 2.4 GM/DL (ref 2.4–3.5)
GLUCOSE SERPL-MCNC: 86 MG/DL (ref 74–100)
HCT VFR BLD AUTO: 46.6 % (ref 42–52)
HGB BLD-MCNC: 16.1 G/DL (ref 14–18)
IMM GRANULOCYTES # BLD AUTO: 0.01 X10(3)/MCL (ref 0–0.04)
IMM GRANULOCYTES NFR BLD AUTO: 0.2 %
LYMPHOCYTES # BLD AUTO: 1.81 X10(3)/MCL (ref 0.6–4.6)
LYMPHOCYTES NFR BLD AUTO: 34.9 %
MCH RBC QN AUTO: 31.3 PG (ref 27–31)
MCHC RBC AUTO-ENTMCNC: 34.5 G/DL (ref 33–36)
MCV RBC AUTO: 90.5 FL (ref 80–94)
MONOCYTES # BLD AUTO: 0.63 X10(3)/MCL (ref 0.1–1.3)
MONOCYTES NFR BLD AUTO: 12.2 %
NEUTROPHILS # BLD AUTO: 2.57 X10(3)/MCL (ref 2.1–9.2)
NEUTROPHILS NFR BLD AUTO: 49.7 %
NRBC BLD AUTO-RTO: 0 %
PLATELET # BLD AUTO: 212 X10(3)/MCL (ref 130–400)
PMV BLD AUTO: 10.4 FL (ref 7.4–10.4)
POTASSIUM SERPL-SCNC: 5 MMOL/L (ref 3.5–5.1)
PROT SERPL-MCNC: 6.8 GM/DL (ref 6.4–8.3)
RBC # BLD AUTO: 5.15 X10(6)/MCL (ref 4.7–6.1)
SODIUM SERPL-SCNC: 142 MMOL/L (ref 136–145)
WBC # SPEC AUTO: 5.18 X10(3)/MCL (ref 4.5–11.5)

## 2024-04-16 PROCEDURE — 36415 COLL VENOUS BLD VENIPUNCTURE: CPT

## 2024-04-16 PROCEDURE — 80053 COMPREHEN METABOLIC PANEL: CPT

## 2024-04-16 PROCEDURE — 85025 COMPLETE CBC W/AUTO DIFF WBC: CPT

## 2024-04-16 NOTE — TELEPHONE ENCOUNTER
----- Message from JOSÉ LUIS Pearce sent at 4/16/2024  2:52 PM CDT -----  Labs reviewed. All labs are within acceptable range. He can continue the prescribed medications.  Thanks  AKILA Maldonado NP

## 2024-04-16 NOTE — TELEPHONE ENCOUNTER
Patient has gotten his labs done , it has been reviewed by [facility] and a detailed vm has been left for the patient .

## 2024-04-16 NOTE — PROGRESS NOTES
Labs reviewed. All labs are within acceptable range. He can continue the prescribed medications.  Thanks  AKILA Maldonado NP

## 2024-04-16 NOTE — TELEPHONE ENCOUNTER
I attemped to make 2 courtesy calls within the past few days to advise patient of message .   I have been unable to reach him . Multiple voicemails have been left. Patient has not returned my calls.

## 2024-04-16 NOTE — TELEPHONE ENCOUNTER
Called patient no answer left detailed vm of result . Advised patient   To call the office if he has any further questions.

## 2024-05-06 PROBLEM — Z00.00 MEDICARE ANNUAL WELLNESS VISIT, SUBSEQUENT: Status: RESOLVED | Noted: 2023-02-01 | Resolved: 2024-05-06

## 2024-06-25 NOTE — PROGRESS NOTES
Subjective:           Patient ID: Fracisco Wynn is a 58 y.o. male.    Chief Complaint: Follow-up      Mr. Wynn is a 57-year-old male here for follow-up. Hx of cerebral palsy and ambulating with walker today. The patient completed 2 years of steroids for PMR. He lives by himself and independent with his activities and ADL's.  He does stationary bike at home 4 times per week.     Denies history of fevers, rashes, photosensitivity, oral or nasal ulcers, h/o MI, stroke, seizures, h/o PE or DVT, Raynaud's phenomenon, uveitis, malignancies.      Family history of autoimmune disease: None  Smoking: Nonsmoker    Feb 28, 2024: Continues on steroid sparing meds for PMR: MTX 15mg weekly and  mg BID. Feeling great. No flares. No complaints. Denies any joint pain. Continues to be active with stationary bike and is independent with his activities. He feels like his medications are all working well for him right now and would like to discuss decreasing the methotrexate. No concerns today. Denies any infections since last office visit. BP is elevated today- denies H/A reports he gets nervous when he goes to doctors offices.    Today June 2024: Continues on reduced dose of MTX 7.5mg Q 7 days, folic acid daily and  mg BID. Denies any increased joint pain or flare ups with decreased dose of MTX. He stands and shows me how easy it is to move his legs and feet. Reports he mowed the grass yesterday without any increased pain. Continues to be active by walking and with his bike for 20 minutes 4 x week. Denies any infections since last office visit. BP is elevated today Labs completed in April 2024.      Review of Systems   Constitutional:  Negative for appetite change, chills and fever.   HENT:  Negative for congestion, ear pain, mouth sores, nosebleeds and trouble swallowing.    Eyes:  Negative for photophobia and discharge.   Respiratory:  Negative for chest tightness and shortness of breath.    Cardiovascular:   "Negative for chest pain.   Gastrointestinal:  Negative for abdominal pain and vomiting.   Endocrine: Negative.    Genitourinary:  Negative for hematuria.   Musculoskeletal:         As per HPI   Skin:  Negative for rash.   Neurological:  Negative for weakness.         Objective:   BP (!) 170/100 (BP Location: Right arm, Patient Position: Sitting, BP Method: Large (Manual))   Pulse 105   Temp 98 °F (36.7 °C) (Oral)   Resp 18   Ht 5' 10" (1.778 m)   Wt 86.3 kg (190 lb 3.2 oz)   SpO2 97%   BMI 27.29 kg/m²          Physical Exam   Constitutional: He is oriented to person, place, and time. He appears well-developed and well-nourished. No distress.   HENT:   Head: Normocephalic and atraumatic.   Right Ear: External ear normal.   Left Ear: External ear normal.   Eyes: Pupils are equal, round, and reactive to light.   Cardiovascular: Normal rate.   Pulmonary/Chest: Effort normal and breath sounds normal.   Abdominal: Soft. There is no abdominal tenderness.   Musculoskeletal:      Cervical back: Neck supple.      Comments: Left hand contracture but is able to open it   Lymphadenopathy:     He has no cervical adenopathy.   Neurological: He is alert and oriented to person, place, and time. He displays normal reflexes. No cranial nerve deficit or sensory deficit. He exhibits normal muscle tone. Coordination normal.   Skin: No rash noted. No erythema.   Vitals reviewed.       No data to display     Assessment:         Medication List with Changes/Refills   Current Medications    KETOCONAZOLE (NIZORAL) 2 % CREAM    Apply topically once daily.       Start Date: 5/3/2023  End Date: --   Changed and/or Refilled Medications    Modified Medication Previous Medication    HYDROXYCHLOROQUINE (PLAQUENIL) 200 MG TABLET hydroxychloroquine (PLAQUENIL) 200 mg tablet       Take 1 tablet (200 mg total) by mouth 2 (two) times daily.    Take 1 tablet (200 mg total) by mouth 2 (two) times daily.       Start Date: 6/26/2024 End Date: --    " Start Date: 2/28/2024 End Date: 6/26/2024   Discontinued Medications    FOLIC ACID (FOLVITE) 1 MG TABLET    Take 1 tablet (1,000 mcg total) by mouth every morning.       Start Date: 2/28/2024 End Date: 6/26/2024    METHOTREXATE 2.5 MG TAB    Take 3 tablets (7.5 mg total) by mouth every 7 days.       Start Date: 2/28/2024 End Date: 6/26/2024         ICD-10-CM ICD-9-CM   1. Rheumatoid arthritis, involving unspecified site, unspecified whether rheumatoid factor present  M06.9 714.0   2. Polymyalgia rheumatica  M35.3 725   3. Drug-induced immunodeficiency  D84.821 279.3    Z79.899 E947.9   4. Elevated blood pressure reading in office without diagnosis of hypertension  R03.0 796.2   5. Impaired mobility  Z74.09 799.89                 Plan:       1. Rheumatoid arthritis, involving unspecified site, unspecified whether rheumatoid factor present  Assessment & Plan:  Stable  Denies joint pain and any flares. Has not needed any PRN prednisone. We discussed slowly tapering medication and monitoring on reduced dose of MTX. He has been stable without flares on the reduced dose of MTX. Will discontinue MTX and now monitor.     Stop MTX- Instructed to call with any symptoms or joint pain.  Stop folic acid daily  Continue hydroxychloroquine 200 mg b.i.d.     Plaquenil Eye Exam: [facility]- Completed 6/14/24 WNL     Labs completed in April and reviewed with the patient during today's office visit      Orders:  -     hydroxychloroquine (PLAQUENIL) 200 mg tablet; Take 1 tablet (200 mg total) by mouth 2 (two) times daily.  Dispense: 60 tablet; Refill: 5    2. Polymyalgia rheumatica  Overview:  The patient completed 2 years of steroids for PMR. Prednisone changed  to only PRN and continue the steroids sparing drugs    Assessment & Plan:  Completed 2 years of steroids- now on steroid sparing drugs   Will continue decreasing medications and monitor. Stopping MTX today and continuing HCQ.   Discussed PMR symptoms to look for while  reducing medications. Instructed to call with changing symptoms or increased joint pain    Orders:  -     hydroxychloroquine (PLAQUENIL) 200 mg tablet; Take 1 tablet (200 mg total) by mouth 2 (two) times daily.  Dispense: 60 tablet; Refill: 5    3. Drug-induced immunodeficiency  Assessment & Plan:  Compromised immune system s/t autoimmune disease and use of immunosuppressive medications.   - Monitor carefully for infections and toxicities.   - Advised strict adherence to age appropriate vaccinations and cancer screenings- including yearly skin exams  - Advised to get immediate medical care if any infection.            4. Elevated blood pressure reading in office without diagnosis of hypertension  Assessment & Plan:  BP today is 164/105  Manual recheck:  170/100  He is not on any medications for hypertension. He had wellness appointment with PCP in Feb and BP was WNL. Reports he gets high blood pressure when going to Dr's offices.   He denies HA, Blurry vision. Reports he will start monitoring blood pressure at home.   ER warning signs given  Encouraged to f/u with PCP      5. Impaired mobility  Assessment & Plan:  Using walker for ambulation today                  30 minutes of total time spent on the encounter, which includes face to face time and non-face to face time preparing to see the patient (eg, review of tests), Obtaining and/or reviewing separately obtained history, Documenting clinical information in the electronic or other health record, Independently interpreting results (not separately reported) and communicating results to the patient/family/caregiver, or Care coordination (not separately reported).

## 2024-06-26 ENCOUNTER — OFFICE VISIT (OUTPATIENT)
Dept: RHEUMATOLOGY | Facility: CLINIC | Age: 59
End: 2024-06-26
Payer: MEDICARE

## 2024-06-26 VITALS
HEART RATE: 105 BPM | SYSTOLIC BLOOD PRESSURE: 170 MMHG | OXYGEN SATURATION: 97 % | RESPIRATION RATE: 18 BRPM | DIASTOLIC BLOOD PRESSURE: 100 MMHG | TEMPERATURE: 98 F | HEIGHT: 70 IN | WEIGHT: 190.19 LBS | BODY MASS INDEX: 27.23 KG/M2

## 2024-06-26 DIAGNOSIS — D84.821 DRUG-INDUCED IMMUNODEFICIENCY: ICD-10-CM

## 2024-06-26 DIAGNOSIS — Z79.899 DRUG-INDUCED IMMUNODEFICIENCY: ICD-10-CM

## 2024-06-26 DIAGNOSIS — M35.3 POLYMYALGIA RHEUMATICA: ICD-10-CM

## 2024-06-26 DIAGNOSIS — R03.0 ELEVATED BLOOD PRESSURE READING IN OFFICE WITHOUT DIAGNOSIS OF HYPERTENSION: ICD-10-CM

## 2024-06-26 DIAGNOSIS — M06.9 RHEUMATOID ARTHRITIS, INVOLVING UNSPECIFIED SITE, UNSPECIFIED WHETHER RHEUMATOID FACTOR PRESENT: Primary | ICD-10-CM

## 2024-06-26 DIAGNOSIS — Z74.09 IMPAIRED MOBILITY: ICD-10-CM

## 2024-06-26 PROCEDURE — 99999 PR PBB SHADOW E&M-EST. PATIENT-LVL IV: CPT | Mod: PBBFAC,,,

## 2024-06-26 PROCEDURE — 99214 OFFICE O/P EST MOD 30 MIN: CPT | Mod: PBBFAC

## 2024-06-26 PROCEDURE — 99214 OFFICE O/P EST MOD 30 MIN: CPT | Mod: S$PBB,,,

## 2024-06-26 RX ORDER — HYDROXYCHLOROQUINE SULFATE 200 MG/1
200 TABLET, FILM COATED ORAL 2 TIMES DAILY
Qty: 60 TABLET | Refills: 5 | Status: SHIPPED | OUTPATIENT
Start: 2024-06-26

## 2024-06-26 NOTE — ASSESSMENT & PLAN NOTE
Completed 2 years of steroids- now on steroid sparing drugs   Will continue decreasing medications and monitor. Stopping MTX today and continuing HCQ.   Discussed PMR symptoms to look for while reducing medications. Instructed to call with changing symptoms or increased joint pain

## 2024-06-26 NOTE — ASSESSMENT & PLAN NOTE
Stable  Denies joint pain and any flares. Has not needed any PRN prednisone. We discussed slowly tapering medication and monitoring on reduced dose of MTX. He has been stable without flares on the reduced dose of MTX. Will discontinue MTX and now monitor.     Stop MTX- Instructed to call with any symptoms or joint pain.  Stop folic acid daily  Continue hydroxychloroquine 200 mg b.i.d.     Plaquenil Eye Exam: [facility]- Completed 6/14/24 WNL     Labs completed in April and reviewed with the patient during today's office visit

## 2024-06-27 NOTE — ASSESSMENT & PLAN NOTE
Compromised immune system s/t autoimmune disease and use of immunosuppressive medications.   - Monitor carefully for infections and toxicities.   - Advised strict adherence to age appropriate vaccinations and cancer screenings- including yearly skin exams  - Advised to get immediate medical care if any infection.

## 2024-06-27 NOTE — ASSESSMENT & PLAN NOTE
BP today is 164/105  Manual recheck:  170/100  He is not on any medications for hypertension. He had wellness appointment with PCP in Feb and BP was WNL. Reports he gets high blood pressure when going to Dr's offices.   He denies HA, Blurry vision. Reports he will start monitoring blood pressure at home.   ER warning signs given  Encouraged to f/u with PCP

## 2024-07-30 ENCOUNTER — TELEPHONE (OUTPATIENT)
Dept: INTERNAL MEDICINE | Facility: CLINIC | Age: 59
End: 2024-07-30
Payer: MEDICARE

## 2024-08-06 ENCOUNTER — OFFICE VISIT (OUTPATIENT)
Dept: INTERNAL MEDICINE | Facility: CLINIC | Age: 59
End: 2024-08-06
Payer: MEDICARE

## 2024-08-06 VITALS
SYSTOLIC BLOOD PRESSURE: 145 MMHG | BODY MASS INDEX: 26.92 KG/M2 | HEIGHT: 70 IN | WEIGHT: 188 LBS | DIASTOLIC BLOOD PRESSURE: 92 MMHG | OXYGEN SATURATION: 98 % | HEART RATE: 116 BPM

## 2024-08-06 DIAGNOSIS — I10 PRIMARY HYPERTENSION: Primary | ICD-10-CM

## 2024-08-06 PROCEDURE — 99213 OFFICE O/P EST LOW 20 MIN: CPT | Mod: ,,, | Performed by: STUDENT IN AN ORGANIZED HEALTH CARE EDUCATION/TRAINING PROGRAM

## 2024-08-06 RX ORDER — LOSARTAN POTASSIUM 25 MG/1
25 TABLET ORAL DAILY
Qty: 30 TABLET | Refills: 3 | Status: SHIPPED | OUTPATIENT
Start: 2024-08-06 | End: 2025-08-06

## 2024-08-15 NOTE — PROGRESS NOTES
Subjective:      Fracisco Wynn  08/15/2024  [id number]      Chief Complaint: Follow-up (6 month F/U BP)       HPI:  Mr Yap presents for 6 months follow up of blood pressure. Blood pressure is elevated today, states has not been taking antihypertensives, when he takes blood pressure at home he has similar readings.     Past Medical History:   Diagnosis Date    Cerebral palsy, unspecified     Polymyalgia rheumatica     Rheumatoid arthritis, unspecified      Past Surgical History:   Procedure Laterality Date    CYST REMOVAL Right     right shoulder    FOOT SURGERY Bilateral     HERNIA REPAIR       No family history on file.  Social History     Tobacco Use    Smoking status: Never     Passive exposure: Never    Smokeless tobacco: Never   Substance and Sexual Activity    Alcohol use: Not Currently    Drug use: Not Currently    Sexual activity: Not Currently     Review of patient's allergies indicates:  No Known Allergies    The following were reviewed at this visit: active problem list, medication list, allergies, family history, social history, and health maintenance.    Medications:    Current Outpatient Medications:     hydroxychloroquine (PLAQUENIL) 200 mg tablet, Take 1 tablet (200 mg total) by mouth 2 (two) times daily., Disp: 60 tablet, Rfl: 5    ketoconazole (NIZORAL) 2 % cream, Apply topically once daily. (Patient not taking: Reported on 8/6/2024), Disp: 60 g, Rfl: 5    losartan (COZAAR) 25 MG tablet, Take 1 tablet (25 mg total) by mouth once daily., Disp: 30 tablet, Rfl: 3      Medications have been reviewed and reconciled with patient at this visit.  Barriers to medications reviewed with patient.    Adverse reactions to current medications reviewed with patient..    Over the counter medications reviewed and reconciled with patient.  Review of Systems   Constitutional:  Negative for chills, diaphoresis, fever and weight loss.   HENT:  Negative for congestion, ear discharge, sinus pain and sore throat.   "  Eyes:  Negative for photophobia.   Respiratory:  Negative for cough, hemoptysis and shortness of breath.    Cardiovascular:  Negative for chest pain, palpitations, claudication, leg swelling and PND.   Gastrointestinal:  Negative for constipation, diarrhea, nausea and vomiting.   Genitourinary:  Negative for dysuria, frequency and urgency.   Musculoskeletal:  Negative for back pain, joint pain, myalgias and neck pain.   Skin:  Negative for itching and rash.   Neurological:  Negative for dizziness, focal weakness and headaches.   Psychiatric/Behavioral:  Negative for depression. The patient does not have insomnia.            Objective:      Vitals:    08/06/24 0937   BP: (!) 145/92   BP Location: Right arm   Patient Position: Sitting   BP Method: Small (Manual)   Pulse: (!) 116   SpO2: 98%   Weight: 85.3 kg (188 lb)   Height: 5' 10" (1.778 m)       Physical Exam  Constitutional:       Appearance: Normal appearance.   HENT:      Head: Normocephalic and atraumatic.   Cardiovascular:      Rate and Rhythm: Normal rate and regular rhythm.      Pulses: Normal pulses.   Pulmonary:      Effort: Pulmonary effort is normal.      Breath sounds: Normal breath sounds.   Abdominal:      General: Abdomen is flat. Bowel sounds are normal. There is no distension.      Palpations: Abdomen is soft.      Tenderness: There is no abdominal tenderness.   Musculoskeletal:         General: No tenderness. Normal range of motion.      Right lower leg: No edema.      Left lower leg: No edema.   Lymphadenopathy:      Cervical: No cervical adenopathy.   Neurological:      General: No focal deficit present.      Mental Status: He is alert and oriented to person, place, and time.               Assessment and Plan:       1. Primary hypertension  Assessment & Plan:  Uncontrolled  Will start losartan 25 mg daily   Previously on amlodipine but was causing swelling of lower extremities       Other orders  -     losartan (COZAAR) 25 MG tablet; Take 1 " tablet (25 mg total) by mouth once daily.  Dispense: 30 tablet; Refill: 3            Follow up: Follow up in about 6 months (around 2/6/2025) for wellness, Labs check.

## 2024-08-15 NOTE — ASSESSMENT & PLAN NOTE
Uncontrolled  Will start losartan 25 mg daily   Previously on amlodipine but was causing swelling of lower extremities

## 2024-10-28 DIAGNOSIS — I10 PRIMARY HYPERTENSION: Primary | ICD-10-CM

## 2024-10-28 RX ORDER — LOSARTAN POTASSIUM 25 MG/1
25 TABLET ORAL
Qty: 30 TABLET | Refills: 6 | Status: SHIPPED | OUTPATIENT
Start: 2024-10-28

## 2024-10-29 ENCOUNTER — OFFICE VISIT (OUTPATIENT)
Dept: RHEUMATOLOGY | Facility: CLINIC | Age: 59
End: 2024-10-29
Payer: MEDICARE

## 2024-10-29 VITALS
RESPIRATION RATE: 18 BRPM | TEMPERATURE: 99 F | SYSTOLIC BLOOD PRESSURE: 140 MMHG | DIASTOLIC BLOOD PRESSURE: 90 MMHG | WEIGHT: 184.81 LBS | HEART RATE: 90 BPM | HEIGHT: 70 IN | OXYGEN SATURATION: 95 % | BODY MASS INDEX: 26.46 KG/M2

## 2024-10-29 DIAGNOSIS — M06.9 RHEUMATOID ARTHRITIS, INVOLVING UNSPECIFIED SITE, UNSPECIFIED WHETHER RHEUMATOID FACTOR PRESENT: ICD-10-CM

## 2024-10-29 DIAGNOSIS — M35.3 POLYMYALGIA RHEUMATICA: Primary | ICD-10-CM

## 2024-10-29 DIAGNOSIS — Z74.09 IMPAIRED MOBILITY: ICD-10-CM

## 2024-10-29 DIAGNOSIS — I10 PRIMARY HYPERTENSION: ICD-10-CM

## 2024-10-29 PROCEDURE — 99999 PR PBB SHADOW E&M-EST. PATIENT-LVL IV: CPT | Mod: PBBFAC,,,

## 2024-10-29 PROCEDURE — 99214 OFFICE O/P EST MOD 30 MIN: CPT | Mod: PBBFAC

## 2024-10-29 PROCEDURE — 99214 OFFICE O/P EST MOD 30 MIN: CPT | Mod: S$PBB,,,

## 2024-10-29 RX ORDER — HYDROXYCHLOROQUINE SULFATE 200 MG/1
200 TABLET, FILM COATED ORAL 2 TIMES DAILY
Qty: 60 TABLET | Refills: 5 | Status: SHIPPED | OUTPATIENT
Start: 2024-10-29

## 2025-01-28 ENCOUNTER — LAB VISIT (OUTPATIENT)
Dept: LAB | Facility: HOSPITAL | Age: 60
End: 2025-01-28
Attending: STUDENT IN AN ORGANIZED HEALTH CARE EDUCATION/TRAINING PROGRAM
Payer: MEDICARE

## 2025-01-28 ENCOUNTER — TELEPHONE (OUTPATIENT)
Dept: INTERNAL MEDICINE | Facility: CLINIC | Age: 60
End: 2025-01-28
Payer: MEDICARE

## 2025-01-28 DIAGNOSIS — G80.9 CEREBRAL PALSY, UNSPECIFIED TYPE: ICD-10-CM

## 2025-01-28 DIAGNOSIS — Z12.5 SCREENING FOR MALIGNANT NEOPLASM OF PROSTATE: ICD-10-CM

## 2025-01-28 DIAGNOSIS — I10 PRIMARY HYPERTENSION: ICD-10-CM

## 2025-01-28 DIAGNOSIS — I10 PRIMARY HYPERTENSION: Primary | ICD-10-CM

## 2025-01-28 LAB
ALBUMIN SERPL-MCNC: 4.6 G/DL (ref 3.5–5)
ALBUMIN/GLOB SERPL: 1.8 RATIO (ref 1.1–2)
ALP SERPL-CCNC: 57 UNIT/L (ref 40–150)
ALT SERPL-CCNC: 44 UNIT/L (ref 0–55)
ANION GAP SERPL CALC-SCNC: 10 MEQ/L
AST SERPL-CCNC: 31 UNIT/L (ref 5–34)
BASOPHILS # BLD AUTO: 0.07 X10(3)/MCL
BASOPHILS NFR BLD AUTO: 1.5 %
BILIRUB SERPL-MCNC: 0.9 MG/DL
BUN SERPL-MCNC: 15.8 MG/DL (ref 8.4–25.7)
CALCIUM SERPL-MCNC: 9.7 MG/DL (ref 8.4–10.2)
CHLORIDE SERPL-SCNC: 108 MMOL/L (ref 98–107)
CHOLEST SERPL-MCNC: 178 MG/DL
CHOLEST/HDLC SERPL: 5 {RATIO} (ref 0–5)
CO2 SERPL-SCNC: 23 MMOL/L (ref 22–29)
CREAT SERPL-MCNC: 0.8 MG/DL (ref 0.72–1.25)
CREAT/UREA NIT SERPL: 20
EOSINOPHIL # BLD AUTO: 0.07 X10(3)/MCL (ref 0–0.9)
EOSINOPHIL NFR BLD AUTO: 1.5 %
ERYTHROCYTE [DISTWIDTH] IN BLOOD BY AUTOMATED COUNT: 13 % (ref 11.5–17)
GFR SERPLBLD CREATININE-BSD FMLA CKD-EPI: >60 ML/MIN/1.73/M2
GLOBULIN SER-MCNC: 2.6 GM/DL (ref 2.4–3.5)
GLUCOSE SERPL-MCNC: 91 MG/DL (ref 74–100)
HCT VFR BLD AUTO: 51.5 % (ref 42–52)
HDLC SERPL-MCNC: 39 MG/DL (ref 35–60)
HGB BLD-MCNC: 16.9 G/DL (ref 14–18)
IMM GRANULOCYTES # BLD AUTO: 0.01 X10(3)/MCL (ref 0–0.04)
IMM GRANULOCYTES NFR BLD AUTO: 0.2 %
LDLC SERPL CALC-MCNC: 114 MG/DL (ref 50–140)
LYMPHOCYTES # BLD AUTO: 1.49 X10(3)/MCL (ref 0.6–4.6)
LYMPHOCYTES NFR BLD AUTO: 32 %
MCH RBC QN AUTO: 30.1 PG (ref 27–31)
MCHC RBC AUTO-ENTMCNC: 32.8 G/DL (ref 33–36)
MCV RBC AUTO: 91.6 FL (ref 80–94)
MONOCYTES # BLD AUTO: 0.51 X10(3)/MCL (ref 0.1–1.3)
MONOCYTES NFR BLD AUTO: 11 %
NEUTROPHILS # BLD AUTO: 2.5 X10(3)/MCL (ref 2.1–9.2)
NEUTROPHILS NFR BLD AUTO: 53.8 %
NRBC BLD AUTO-RTO: 0 %
PLATELET # BLD AUTO: 199 X10(3)/MCL (ref 130–400)
PMV BLD AUTO: 10.4 FL (ref 7.4–10.4)
POTASSIUM SERPL-SCNC: 4.8 MMOL/L (ref 3.5–5.1)
PROT SERPL-MCNC: 7.2 GM/DL (ref 6.4–8.3)
PSA SERPL-MCNC: 1.45 NG/ML
RBC # BLD AUTO: 5.62 X10(6)/MCL (ref 4.7–6.1)
SODIUM SERPL-SCNC: 141 MMOL/L (ref 136–145)
TRIGL SERPL-MCNC: 126 MG/DL (ref 34–140)
VLDLC SERPL CALC-MCNC: 25 MG/DL
WBC # BLD AUTO: 4.65 X10(3)/MCL (ref 4.5–11.5)

## 2025-01-28 PROCEDURE — 80053 COMPREHEN METABOLIC PANEL: CPT

## 2025-01-28 PROCEDURE — 84153 ASSAY OF PSA TOTAL: CPT

## 2025-01-28 PROCEDURE — 36415 COLL VENOUS BLD VENIPUNCTURE: CPT

## 2025-01-28 PROCEDURE — 85025 COMPLETE CBC W/AUTO DIFF WBC: CPT

## 2025-01-28 PROCEDURE — 80061 LIPID PANEL: CPT

## 2025-01-28 NOTE — TELEPHONE ENCOUNTER
----- Message from Satish sent at 1/28/2025 11:23 AM CST -----  Who Called: Fracisco Donmargaretjose    Patient is returning phone call    Who Left Message for Patient:  Does the patient know what this is regarding?:      Preferred Method of Contact: Phone Call  Patient's Preferred Phone Number on File: [phone]   Best Call Back Number, if different:  Additional Information: [facility] [phone] opt 3 with pt lab need pt lab orders has appt today at 130 pls advise

## 2025-02-04 ENCOUNTER — TELEPHONE (OUTPATIENT)
Dept: INTERNAL MEDICINE | Facility: CLINIC | Age: 60
End: 2025-02-04
Payer: MEDICARE

## 2025-02-11 ENCOUNTER — OFFICE VISIT (OUTPATIENT)
Dept: INTERNAL MEDICINE | Facility: CLINIC | Age: 60
End: 2025-02-11
Payer: MEDICARE

## 2025-02-11 VITALS
SYSTOLIC BLOOD PRESSURE: 135 MMHG | WEIGHT: 186 LBS | DIASTOLIC BLOOD PRESSURE: 88 MMHG | BODY MASS INDEX: 26.63 KG/M2 | TEMPERATURE: 97 F | HEIGHT: 70 IN | HEART RATE: 107 BPM | OXYGEN SATURATION: 97 %

## 2025-02-11 DIAGNOSIS — I10 PRIMARY HYPERTENSION: ICD-10-CM

## 2025-02-11 DIAGNOSIS — G80.9 CEREBRAL PALSY, UNSPECIFIED TYPE: ICD-10-CM

## 2025-02-11 DIAGNOSIS — M35.3 POLYMYALGIA RHEUMATICA: Primary | ICD-10-CM

## 2025-02-11 DIAGNOSIS — Z00.00 MEDICARE ANNUAL WELLNESS VISIT, SUBSEQUENT: ICD-10-CM

## 2025-02-11 PROBLEM — M06.9 RHEUMATOID ARTHRITIS: Status: RESOLVED | Noted: 2022-06-06 | Resolved: 2025-02-11

## 2025-02-11 NOTE — ASSESSMENT & PLAN NOTE
Currently on hydroxychloroquine. Prednisone and MTX have been discontinued  Stable, no exacerbation of symptoms

## 2025-02-11 NOTE — PROGRESS NOTES
Subjective:      Fracisco Wynn  02/11/2025  [id number]    Madalyn Rausch MD  Patient Care Team:  Madalyn Rausch MD as PCP - General (Internal Medicine)          Visit Type:a scheduled routine follow-up visit    Chief Complaint: Medicare IPPE    HPI  Mr Yap presents for Medicare annual wellness visit. Patient does not have new complaints since last visit, states he is feeling well. Labs have been reviewed in office and they are within normal limits.     Past Medical History:   Diagnosis Date    Cerebral palsy, unspecified     Polymyalgia rheumatica     Rheumatoid arthritis, unspecified      Past Surgical History:   Procedure Laterality Date    CYST REMOVAL Right     right shoulder    FOOT SURGERY Bilateral     HERNIA REPAIR       No family history on file.  Social History     Tobacco Use    Smoking status: Never     Passive exposure: Never    Smokeless tobacco: Never   Substance and Sexual Activity    Alcohol use: Not Currently    Drug use: Not Currently    Sexual activity: Not Currently     Active Problem List with Overview Notes    Diagnosis Date Noted    Elevated blood pressure reading in office without diagnosis of hypertension 06/26/2024    Drug-induced immunodeficiency 02/02/2024    Impaired mobility 10/24/2023    Medicare annual wellness visit, subsequent 02/01/2023    Polymyalgia rheumatica 06/06/2022     The patient completed 2 years of steroids for PMR. Prednisone changed  to only PRN and continue the steroids sparing drugs      Hypertension 06/06/2022    Cerebral palsy 06/06/2022     Review of patient's allergies indicates:  No Known Allergies    The following were reviewed at this visit: active problem list, medication list, allergies, family history, social history, and health maintenance.    Medications:    Current Outpatient Medications:     hydroxychloroquine (PLAQUENIL) 200 mg tablet, Take 1 tablet (200 mg total) by mouth 2 (two) times daily., Disp: 60 tablet, Rfl: 5     "losartan (COZAAR) 25 MG tablet, TAKE ONE TABLET ONCE DAILY, Disp: 30 tablet, Rfl: 6      Medications have been reviewed and reconciled with patient at this visit.  Barriers to medications reviewed with patient.    Adverse reactions to current medications reviewed with patient..    Over the counter medications reviewed and reconciled with patient.    Opioid Screening: Patient medication list reviewed, patient is not taking prescription opioids. Patient is not using additional opioids than prescribed. Patient is at low risk of substance abuse based on this opioid use history.     Review of Systems   Constitutional:  Negative for chills, diaphoresis, fever and weight loss.   HENT:  Negative for congestion, ear discharge, sinus pain and sore throat.    Eyes:  Negative for photophobia.   Respiratory:  Negative for cough, hemoptysis and shortness of breath.    Cardiovascular:  Negative for chest pain, palpitations, claudication, leg swelling and PND.   Gastrointestinal:  Negative for constipation, diarrhea, nausea and vomiting.   Genitourinary:  Negative for dysuria, frequency and urgency.   Musculoskeletal:  Negative for back pain, joint pain, myalgias and neck pain.   Skin:  Negative for itching and rash.   Neurological:  Negative for dizziness, focal weakness and headaches.   Psychiatric/Behavioral:  Negative for depression. The patient does not have insomnia.                   Objective:      Vitals:    02/11/25 0920   BP: 135/88   Pulse: 107   Temp: 97.2 °F (36.2 °C)   SpO2: 97%   Weight: 84.4 kg (186 lb)   Height: 5' 10" (1.778 m)       Physical Exam  Constitutional:       Appearance: Normal appearance.   HENT:      Head: Normocephalic and atraumatic.   Cardiovascular:      Rate and Rhythm: Normal rate and regular rhythm.      Pulses: Normal pulses.   Pulmonary:      Effort: Pulmonary effort is normal.      Breath sounds: Normal breath sounds.   Abdominal:      General: Abdomen is flat. Bowel sounds are normal. " There is no distension.      Palpations: Abdomen is soft.      Tenderness: There is no abdominal tenderness.   Musculoskeletal:         General: No tenderness. Normal range of motion.      Right lower leg: No edema.      Left lower leg: No edema.   Lymphadenopathy:      Cervical: No cervical adenopathy.   Neurological:      General: No focal deficit present.      Mental Status: He is alert and oriented to person, place, and time.           A comprehensive HEALTH RISK ASSESSMENT was completed today. Results are summarized below:    The following EMOTIONAL/SOCIAL CONCERNS were identified on today's screening for Social Isolation, Depression and Anxiety:  *Patient reports his health has LIMITED his SOCIAL INTERACTIONS. (During the past four weeks, has your physical and/or emotional health limited your social activities with family, friends, neighbors, or groups?: (!) Yes)  <repeat PHQ-9>   There are NO COGNITIVE FUNCTION CONCERNS identified on today's screening.  The following FUNCTIONAL AND/OR SAFETY CONCERNS were identified on today's screening for Physical Symptoms, Nutritional, Home Safety/Living Situation, Fall Risk, Activities of Daily Living, Independent Activities of Daily Living, Physical Activity,Timed Up and Go test and Whisper test::  *Patient reports NO ROUTINE EXERCISE. (On average, how many days per week do you engage in moderate to strenuous exercise (like a brisk walk)?: (!) 0)  *Patient reports he has FALLEN TWO OR MORE TIMES IN THE PAST YEAR. (Have you fallen two or more times in the past year?: (!) Yes (10/2024))  *Patient reports he NEEDS AMBULATION ASSISTANCE. (Do you use an assistance device to easily get around?: (!) Yes)(Ambulation Assistance: Walker (wheeled))     The patient reports NO OPIOID PRESCRIPTIONS. This was confirmed through medication reconciliation and the [facility] website.    The patient is NOT A TOBACCO USER.        All Questions regarding food, transportation or housing were not  answered today.        Laboratory Reviewed ({Yes)  Lab Results   Component Value Date    WBC 4.65 01/28/2025    HGB 16.9 01/28/2025    HCT 51.5 01/28/2025     01/28/2025    CHOL 178 01/28/2025    TRIG 126 01/28/2025    HDL 39 01/28/2025    ALT 44 01/28/2025    AST 31 01/28/2025     01/28/2025    K 4.8 01/28/2025     (H) 01/28/2025    CREATININE 0.80 01/28/2025    BUN 15.8 01/28/2025    CO2 23 01/28/2025    TSH 0.5984 02/10/2021    PSA 1.45 01/28/2025         Assessment and Plan:       Problem List Items Addressed This Visit          Neuro    Cerebral palsy     Independent on daily activities  Lives alone, mom lives close  Able to drive               Cardiac/Vascular    Hypertension     Controlled  Continue losartan             Immunology/Multi System    Polymyalgia rheumatica - Primary     Currently on hydroxychloroquine. Prednisone and MTX have been discontinued  Stable, no exacerbation of symptoms             Other    Medicare annual wellness visit, subsequent     Colonoscopy: done 12/2018  Labs: done and reviewed in office               Care Plan/Goals: Reviewed    Goals    None         Follow up: Follow up in about 6 months (around 8/11/2025) for Bp follow up, Medication f/u.    No orders of the defined types were placed in this encounter.      Medicare Annual Wellness and Personalized Prevention Plan:   Fall Risk + Home Safety + Hearing Impairment + Depression Screen + Cognitive Impairment Screen + Health Risk Assessment all reviewed.     Health Maintenance Topics with due status: Not Due       Topic Last Completion Date    Colorectal Cancer Screening 12/20/2018    PROSTATE-SPECIFIC ANTIGEN 01/28/2025    Lipid Panel 01/28/2025    RSV Vaccine (Age 60+ and Pregnant patients) Not Due      The patient's Health Maintenance was reviewed and the following appears to be due at this time:   Health Maintenance Due   Topic Date Due    Hepatitis C Screening  Never done    HIV Screening  Never done     TETANUS VACCINE  Never done    Pneumococcal Vaccines (Age 50+) (1 of 2 - PCV) Never done    Hemoglobin A1c (Diabetic Prevention Screening)  Never done    COVID-19 Vaccine (4 - 2024-25 season) 09/01/2024       Advance Care Planning   I attest to discussing Advance Care Planning with patient and/or family member.  Education was provided including the importance of the Health Care Power of , Advance Directives, and/or [place] documentation.  The patient expressed understanding to the importance of this information and discussion.

## 2025-03-25 NOTE — ASSESSMENT & PLAN NOTE
He completed 2 years of steroids for PMR. Prednisone changed to only PRN and continue the steroids sparing drugs. MTX and HCQ. Started decreasing these meds.   Have slowly decreased and now discontinued MTX. HCQ was decreased from BID to Daily. He started taking HCQ daily 2 weeks ago.  He is without any increase in joint pain. He is stable. He does endorse nervousness today because he does not want to be discharged from Rheumatology clinic in case his symptoms would reappear in the future. Reassurance given.    Stop Hydroxychloroqine  Discussed PMR symptoms to look for while reducing medications. Instructed to call with changing symptoms or increased joint pain

## 2025-03-25 NOTE — PROGRESS NOTES
Subjective:           Patient ID: Fracisco Wynn is a 59 y.o. male.    Chief Complaint: Follow-up      Mr. Wynn is a 57-year-old male here for follow-up. Hx of cerebral palsy and ambulating with walker today. The patient completed 2 years of steroids for PMR. He lives by himself and independent with his activities and ADL's.  He does stationary bike at home 4 times per week.     Denies history of fevers, rashes, photosensitivity, oral or nasal ulcers, h/o MI, stroke, seizures, h/o PE or DVT, Raynaud's phenomenon, uveitis, malignancies.      Family history of autoimmune disease: None  Smoking: Nonsmoker    Feb 28, 2024: Continues on steroid sparing meds for PMR: MTX 15mg weekly and  mg BID. Feeling great. No flares. No complaints. Denies any joint pain. Continues to be active with stationary bike and is independent with his activities. He feels like his medications are all working well for him right now and would like to discuss decreasing the methotrexate. No concerns today. Denies any infections since last office visit. BP is elevated today- denies H/A reports he gets nervous when he goes to doctors offices.    June 2024: Continues on reduced dose of MTX 7.5mg Q 7 days, folic acid daily and  mg BID. Denies any increased joint pain or flare ups with decreased dose of MTX. He stands and shows me how easy it is to move his legs and feet. Reports he mowed the grass yesterday without any increased pain. Continues to be active by walking and with his bike for 20 minutes 4 x week. Denies any infections since last office visit. BP is elevated today Labs completed in April 2024.     October 2024: Continues on  mg BID. No increase in joint pain since stopping MTX at last visit in June 2024. Reports recent fall and injury to left foot, completed xrays and was prescribed short course of Meloxicam. Denies morning stiffness/ joint pain. Doing well. Continues to ride stationary bike about 4 x week.  "Does report trouble sleeping and white patches on tongue, reports he will address these issues with PCP. BP slightly elevated today. Denies any recent infections. Plaquenil Eye Exam completed June 2024    Today March 2025: Reports he is doing great. He Continues on  mg daily. Reports he self reduced HCQ to once daily to see if he would notice a difference before this visit. He denies joint pain in his shoulders, hips, bilateral hands. Denies morning stiffness. He actually reports his left hand does feel "looser" and slightly able to open it more. He continues to ride stationary bake. Elevated BP today. He does report nervousness about not wanting to get discharged from Rheumatology clinic and also did get visibly upset with elevated BP thus making BP more elevated. He did take his medication today and denies HA/blurry vision. Denies any recent infections. No complaints today.     Review of Systems   Constitutional:  Negative for appetite change, chills and fever.   HENT:  Negative for congestion, ear pain, mouth sores, nosebleeds and trouble swallowing.         White patches on tongue   Eyes:  Negative for photophobia and discharge.   Respiratory:  Negative for chest tightness and shortness of breath.    Cardiovascular:  Negative for chest pain.   Gastrointestinal:  Negative for abdominal pain and vomiting.   Endocrine: Negative.    Genitourinary:  Negative for hematuria.   Musculoskeletal:         As per HPI   Skin:  Negative for rash.   Neurological:  Negative for weakness.   Psychiatric/Behavioral:  Positive for sleep disturbance.         Trouble sleeping       Objective:   BP (!) 160/100 (BP Location: Right arm, Patient Position: Sitting)   Pulse (!) 111   Temp 98.1 °F (36.7 °C) (Oral)   Resp 18   Ht 5' 10" (1.778 m)   Wt 87 kg (191 lb 12.8 oz)   SpO2 96%   BMI 27.52 kg/m²      Physical Exam   Constitutional: He is oriented to person, place, and time. He appears well-developed and well-nourished. " No distress.   HENT:   Head: Normocephalic and atraumatic.   Right Ear: External ear normal.   Left Ear: External ear normal.   Mouth/Throat: White patches on tongue  Eyes: Pupils are equal, round, and reactive to light.   Cardiovascular: Normal rate.   Pulmonary/Chest: Effort normal and breath sounds normal.   Abdominal: Soft. There is no abdominal tenderness.   Musculoskeletal:      Cervical back: Neck supple.      Comments: Left hand contracture but is able to open it   Lymphadenopathy:     He has no cervical adenopathy.   Neurological: He is alert and oriented to person, place, and time. He displays normal reflexes. No cranial nerve deficit or sensory deficit. He exhibits normal muscle tone. Coordination normal.   Skin: No rash noted. No erythema.   Vitals reviewed.       No data to display     Assessment:       Medication List with Changes/Refills   Current Medications    LOSARTAN (COZAAR) 25 MG TABLET    TAKE ONE TABLET ONCE DAILY       Start Date: 10/28/2024End Date: --   Discontinued Medications    HYDROXYCHLOROQUINE (PLAQUENIL) 200 MG TABLET    Take 1 tablet (200 mg total) by mouth 2 (two) times daily.       Start Date: 10/29/2024End Date: 3/26/2025         ICD-10-CM ICD-9-CM   1. Polymyalgia rheumatica  M35.3 725   2. Primary hypertension  I10 401.9   3. Impaired mobility  Z74.09 799.89   4. Drug-induced immunodeficiency  D84.821 279.3    Z79.899 E947.9           Plan:       1. Polymyalgia rheumatica  Assessment & Plan:  He completed 2 years of steroids for PMR. Prednisone changed to only PRN and continue the steroids sparing drugs. MTX and HCQ. Started decreasing these meds.   Have slowly decreased and now discontinued MTX. HCQ was decreased from BID to Daily. He started taking HCQ daily 2 weeks ago.  He is without any increase in joint pain. He is stable. He does endorse nervousness today because he does not want to be discharged from Rheumatology clinic in case his symptoms would reappear in the future.  Reassurance given.    Stop Hydroxychloroqine  Discussed PMR symptoms to look for while reducing medications. Instructed to call with changing symptoms or increased joint pain      2. Primary hypertension  Assessment & Plan:  BP today is 161/146  Manual recheck: 160/100  Reports he took his medication today. He is unable to check his BP at home  He does endorse nervousness about the possibility of being discharged from Rheumatology Clinic. He does not want to be discharged from Rheum clinic for in case his symptoms would reappear. Patient was reassured.   He also reports not eating today and is upset about his BP being elevated, getting worked up and making BP worse.  Denies HA/blurry vision  Continue RX medications as prescribed by managing provider-   ER precautions given      3. Impaired mobility  Assessment & Plan:  Using walker for ambulation today       4. Drug-induced immunodeficiency  Assessment & Plan:  Resovled. He is no longer on methotrexate.  He reduced HCQ to daily 2 weeks ago  Stopping hydroxychloroquine today           35 minutes of total time spent on the encounter, which includes face to face time and non-face to face time preparing to see the patient (eg, review of tests), Obtaining and/or reviewing separately obtained history, Documenting clinical information in the electronic or other health record, Independently interpreting results (not separately reported) and communicating results to the patient/family/caregiver, or Care coordination (not separately reported).

## 2025-03-26 ENCOUNTER — OFFICE VISIT (OUTPATIENT)
Dept: RHEUMATOLOGY | Facility: CLINIC | Age: 60
End: 2025-03-26
Payer: MEDICARE

## 2025-03-26 VITALS
WEIGHT: 191.81 LBS | HEART RATE: 111 BPM | SYSTOLIC BLOOD PRESSURE: 160 MMHG | RESPIRATION RATE: 18 BRPM | TEMPERATURE: 98 F | OXYGEN SATURATION: 96 % | DIASTOLIC BLOOD PRESSURE: 100 MMHG | HEIGHT: 70 IN | BODY MASS INDEX: 27.46 KG/M2

## 2025-03-26 DIAGNOSIS — Z74.09 IMPAIRED MOBILITY: ICD-10-CM

## 2025-03-26 DIAGNOSIS — I10 PRIMARY HYPERTENSION: ICD-10-CM

## 2025-03-26 DIAGNOSIS — M35.3 POLYMYALGIA RHEUMATICA: Primary | ICD-10-CM

## 2025-03-26 DIAGNOSIS — Z79.899 DRUG-INDUCED IMMUNODEFICIENCY: ICD-10-CM

## 2025-03-26 DIAGNOSIS — D84.821 DRUG-INDUCED IMMUNODEFICIENCY: ICD-10-CM

## 2025-03-26 PROCEDURE — 99214 OFFICE O/P EST MOD 30 MIN: CPT | Mod: PBBFAC

## 2025-03-26 PROCEDURE — 99999 PR PBB SHADOW E&M-EST. PATIENT-LVL IV: CPT | Mod: PBBFAC,,,

## 2025-03-31 NOTE — ASSESSMENT & PLAN NOTE
BP today is 161/146  Manual recheck: 160/100  Reports he took his medication today. He is unable to check his BP at home  He does endorse nervousness about the possibility of being discharged from Rheumatology Clinic. He does not want to be discharged from Rheum clinic for in case his symptoms would reappear. Patient was reassured.   He also reports not eating today and is upset about his BP being elevated, getting worked up and making BP worse.  Denies HA/blurry vision  Continue RX medications as prescribed by managing provider-   ER precautions given

## 2025-03-31 NOTE — ASSESSMENT & PLAN NOTE
Resovled. He is no longer on methotrexate.  He reduced HCQ to daily 2 weeks ago  Stopping hydroxychloroquine today

## 2025-06-03 DIAGNOSIS — I10 PRIMARY HYPERTENSION: ICD-10-CM

## 2025-06-03 RX ORDER — LOSARTAN POTASSIUM 25 MG/1
25 TABLET ORAL
Qty: 30 TABLET | Refills: 6 | OUTPATIENT
Start: 2025-06-03

## 2025-06-30 DIAGNOSIS — M06.9 RHEUMATOID ARTHRITIS, INVOLVING UNSPECIFIED SITE, UNSPECIFIED WHETHER RHEUMATOID FACTOR PRESENT: Primary | ICD-10-CM

## 2025-06-30 DIAGNOSIS — M35.3 POLYMYALGIA RHEUMATICA: ICD-10-CM

## 2025-06-30 RX ORDER — HYDROXYCHLOROQUINE SULFATE 200 MG/1
200 TABLET, FILM COATED ORAL 2 TIMES DAILY
Qty: 60 TABLET | Refills: 5 | Status: SHIPPED | OUTPATIENT
Start: 2025-06-30 | End: 2025-12-31

## 2025-06-30 NOTE — PROGRESS NOTES
6/30/25- OK to restart HCQ. Rx sent to pharmacy      Hannah Jarquin, Aleisha Torres, JOSÉ LUIS  Patients mother Regina called wanting to let us know that he would like to re start the hydroxychloroquine . He is having swelling in his hands, when he lifts his arm he has pain in his shoulder and noticing that he Is walking much slower. He is wanting to know if him stopping the hydroxychloroquine could be a cause of some of his problems? Please Advise. Thanks

## 2025-07-01 DIAGNOSIS — I10 PRIMARY HYPERTENSION: ICD-10-CM

## 2025-07-01 RX ORDER — LOSARTAN POTASSIUM 25 MG/1
25 TABLET ORAL
Qty: 30 TABLET | Refills: 6 | Status: SHIPPED | OUTPATIENT
Start: 2025-07-01

## 2025-07-23 NOTE — PROGRESS NOTES
Patient ID: [id number]     Chief Complaint: Pain (Complains of bilateral hip pain and having a hard time walking. Is taking Advil, it was helping in the beginning but is no longer effective. He reports that he was feeling good on Tuesday but has been having a lot of pain since. )      HPI:     This is a telemedicine note. Patient was treated using telemedicine, real time audio and video, according to [facility] protocols. I, Aleisha HERNANDEZ, conducted the visit from the [facility]. The patient participated in the visit at a non-[facility] location selected by the patient, identified below. I am licensed in the state where the patient stated they are located. The patient stated that they understood and accepted the privacy and security risks to their information at their location. This visit is not recorded.    Patient was located in [place]      Fracisco Wynn is a 60 y.o. male here today for a telemedicine visit.   Audio Only Telehealth Visit     The patient location is: in [place]  The chief complaint leading to consultation is: increase pain/flare up  Visit type: Virtual visit with audio only (telephone)  Total time spent in medical discussion with patient: 13 minutes  Total time spent on date of the encounter: 28 minutes     The reason for the audio only service rather than synchronous audio and video virtual visit was related to technical difficulties or patient preference/necessity.     Each patient to whom I provide medical services by telemedicine is:  (1) informed of the relationship between the physician and patient and the respective role of any other health care provider with respect to management of the patient; and (2) notified that they may decline to receive medical services by telemedicine and may withdraw from such care at any time. Patient verbally consented to receive this service via voice-only telephone call.       HPI: Mr. Wynn is a 60-year-old male here for  follow-up. Hx of cerebral palsy and ambulating with walker today. The patient completed 2 years of steroids for PMR. He lives by himself and independent with his activities and ADL's.  He does stationary bike at home 4 times per week.      Denies history of fevers, rashes, photosensitivity, oral or nasal ulcers, h/o MI, stroke, seizures, h/o PE or DVT, Raynaud's phenomenon, uveitis, malignancies.      Family history of autoimmune disease: None  Smoking: Nonsmoker     Feb 28, 2024: Continues on steroid sparing meds for PMR: MTX 15mg weekly and  mg BID. Feeling great. No flares. No complaints. Denies any joint pain. Continues to be active with stationary bike and is independent with his activities. He feels like his medications are all working well for him right now and would like to discuss decreasing the methotrexate. No concerns today. Denies any infections since last office visit. BP is elevated today- denies H/A reports he gets nervous when he goes to doctors offices.     June 2024: Continues on reduced dose of MTX 7.5mg Q 7 days, folic acid daily and  mg BID. Denies any increased joint pain or flare ups with decreased dose of MTX. He stands and shows me how easy it is to move his legs and feet. Reports he mowed the grass yesterday without any increased pain. Continues to be active by walking and with his bike for 20 minutes 4 x week. Denies any infections since last office visit. BP is elevated today Labs completed in April 2024.      October 2024: Continues on  mg BID. No increase in joint pain since stopping MTX at last visit in June 2024. Reports recent fall and injury to left foot, completed xrays and was prescribed short course of Meloxicam. Denies morning stiffness/ joint pain. Doing well. Continues to ride stationary bike about 4 x week. Does report trouble sleeping and white patches on tongue, reports he will address these issues with PCP. BP slightly elevated today. Denies any  "recent infections. Plaquenil Eye Exam completed June 2024 March 2025: Reports he is doing great. He Continues on  mg daily. Reports he self reduced HCQ to once daily to see if he would notice a difference before this visit. He denies joint pain in his shoulders, hips, bilateral hands. Denies morning stiffness. He actually reports his left hand does feel "looser" and slightly able to open it more. He continues to ride stationary bake. Elevated BP today. He does report nervousness about not wanting to get discharged from Rheumatology clinic and also did get visibly upset with elevated BP thus making BP more elevated. He did take his medication today and denies HA/blurry vision. Denies any recent infections. No complaints today.    Today July 2025: Virtual visit for pain Reports he started having increase joint pain last month in June and called office on 6/30/25 requesting to restart HCQ. Reports pain in bilateral hips as a "rollercoaster" . Reports that this pain comes and goes. For example 2 days ago he was able to attend a dentist appointment and today it is hard for him to walk. He is also c/o of left shoulder pain. Tried treatment: changed bed mattress and Advil with minimal improvement. Denies any change in physical activity, injury, or recent infections.    In review- he was diagnosed with PMR in Feb 2021, completed 2 years of steroids and was recently discontinued the steroid sparing agents. Due to pattern and severity of pain may be PMR flare? Will initiate trial of low dose glucocorticoids- order labs including CRP and sed rate and f/u in 1.5 weeks at scheduled office visit. His mother, Regina, was present for entire duration of audio visit.       Past Medical History:   Diagnosis Date    Cerebral palsy, unspecified     Polymyalgia rheumatica     Rheumatoid arthritis, unspecified         Past Surgical History:   Procedure Laterality Date    CYST REMOVAL Right     right shoulder    FOOT SURGERY " Bilateral     HERNIA REPAIR          Social History     Tobacco Use    Smoking status: Never     Passive exposure: Never    Smokeless tobacco: Never   Substance and Sexual Activity    Alcohol use: Not Currently    Drug use: Not Currently    Sexual activity: Not Currently        Current Outpatient Medications   Medication Instructions    hydroxychloroquine (PLAQUENIL) 200 mg, Oral, 2 times daily, After food    losartan (COZAAR) 25 mg, Oral    predniSONE (DELTASONE) 15 mg, Oral, Daily, AFTER BREAKFAST       Review of patient's allergies indicates:  No Known Allergies     Patient Care Team:  Madalyn Rausch MD as PCP - General (Internal Medicine)     Subjective:     Review of Systems    12 point review of systems conducted, negative except as stated in the history of present illness. See HPI for details.    Objective:     Visit Vitals  Wt 86.2 kg (190 lb)   BMI 27.26 kg/m²       Physical Exam    Physical Exam: LIMITED DUE TO TELEMEDICINE RESTRICTIONS.      Assessment:       ICD-10-CM ICD-9-CM   1. Polymyalgia rheumatica  M35.3 725        Plan:     1. 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    He completed 2 years of steroids for PMR. Prednisone changed to only PRN then discontinued and continue the steroids sparing drugs: MTX and HCQ. Started decreasing these meds.  In Feb 2024 MTX reduced from 15 to 7.5mg. In June 2024 MTX was discontinued. HCQ eventually discontinued 2025.    He is now reporting increased pain in bilateral hips- with good days with minimal pain and others barely able to walk.  He restarted HCQ 3 weeks ago. Pattern and severity of pain may be PMR flare?    Following UpToDate, will initiate trial of low dose glucocorticoids- will start prednisone 15 mg daily. Will reassess in 1.5 weeks- has appointment  on 8/6/25. Will order labs to complete at that time- will check CRP and sed rate.  Monitor for GCA symptoms: new-onset HA, abrupt onset of visual impairment, jaw claudication, unexplained fever.    Discussed potential side effects of prednisone.        Orders:  -     predniSONE (DELTASONE) 10 MG tablet; Take 1.5 tablets (15 mg total) by mouth once daily. AFTER BREAKFAST  Dispense: 45 tablet; Refill: 1  -     Sedimentation rate; Future; Expected date: 07/24/2025  -     C-Reactive Protein; Future; Expected date: 07/24/2025  -     Comprehensive Metabolic Panel; Future; Expected date: 07/24/2025  -     CBC Auto Differential; Future; Expected date: 07/24/2025         No follow-ups on file. In addition to their scheduled follow up, the patient has also been instructed to follow up on as needed basis.     Future Appointments   Date Time Provider Department Center   8/5/2025  1:30 PM [facility]   8/12/2025 10:20 AM Madalyn Rausch MD [facility] [id number]        Audio Time : 13 minutes  28 minutes of total time spent on the encounter, which includes face to face time and non-face to face time preparing to see the patient (eg, review of tests), Obtaining and/or reviewing separately obtained history, Documenting clinical information in the electronic or other health record, Independently interpreting results (not separately reported) and communicating results to the patient/family/caregiver, or Care coordination (not separately reported).      JOSÉ LUIS Pearce     This service was not originating from a related E/M service provided within the previous 7 days nor will  to an E/M service or procedure within the next 24 hours or my soonest available appointment.  Prevailing standard of care was able to be met in this audio-only visit.      {

## 2025-07-23 NOTE — ASSESSMENT & PLAN NOTE
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    He completed 2 years of steroids for PMR. Prednisone changed to only PRN then discontinued and continue the steroids sparing drugs: MTX and HCQ. Started decreasing these meds.  In Feb 2024 MTX reduced from 15 to 7.5mg. In June 2024 MTX was discontinued. HCQ eventually discontinued 2025.    He is now reporting increased pain in bilateral hips- with good days with minimal pain and others barely able to walk.  He restarted HCQ 3 weeks ago. Pattern and severity of pain may be PMR flare?    Following UpToDate, will initiate trial of low dose glucocorticoids- will start prednisone 15 mg daily. Will reassess in 1.5 weeks- has appointment on 8/6/25. Will order labs to complete at that time- will check CRP and sed rate.  Monitor for GCA symptoms: new-onset HA, abrupt onset of visual impairment, jaw claudication, unexplained fever.    Discussed potential side effects of prednisone.

## 2025-07-24 ENCOUNTER — OFFICE VISIT (OUTPATIENT)
Dept: RHEUMATOLOGY | Facility: CLINIC | Age: 60
End: 2025-07-24
Payer: MEDICARE

## 2025-07-24 VITALS — WEIGHT: 190 LBS | BODY MASS INDEX: 27.26 KG/M2

## 2025-07-24 DIAGNOSIS — M35.3 POLYMYALGIA RHEUMATICA: Primary | ICD-10-CM

## 2025-07-24 RX ORDER — PREDNISONE 10 MG/1
15 TABLET ORAL DAILY
Qty: 45 TABLET | Refills: 1 | Status: SHIPPED | OUTPATIENT
Start: 2025-07-24

## 2025-08-04 ENCOUNTER — TELEPHONE (OUTPATIENT)
Facility: CLINIC | Age: 60
End: 2025-08-04
Payer: MEDICARE

## 2025-08-04 DIAGNOSIS — M35.3 POLYMYALGIA RHEUMATICA: Primary | ICD-10-CM

## 2025-08-04 DIAGNOSIS — Z79.899 DRUG-INDUCED IMMUNODEFICIENCY: ICD-10-CM

## 2025-08-04 DIAGNOSIS — D84.821 DRUG-INDUCED IMMUNODEFICIENCY: ICD-10-CM

## 2025-08-04 NOTE — ASSESSMENT & PLAN NOTE
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  He completed 2 years of steroids for PMR. Prednisone eventually changed to only PRN then discontinued and continued steroid sparing medications: MTX and HCQ. Started decreasing these meds. In Feb 2024 MTX reduced from 15 to 7.5mg. In June 2024 MTX was discontinued. HCQ eventually discontinued 2025.    Virtual Visit 7/24/25- having increased pain in bilateral hips- with good days with minimal pain and others barely able to walk.  Pattern and severity of pain may be PMR flare? Following UpToDate, initiated a trial of low dose glucocorticoids-  prednisone 15 mg daily. Reassessed today with significant improvement. He is now able to walk, resolution of pain in bilateral hips, Reports within 4 days he was able to mow his grass. Did discuss to monitor for GCA symptoms: new-onset HA, abrupt onset of visual impairment, jaw claudication, unexplained fever. Denies all of these.    PMR flare vs other etiology? No recent imaging of spine or hips to check for other causes of associated symptoms. Labs completed today- normal inflammatory markers. Significant and rapid response to prednisone- possible PMR flare but cannot rule out other etiologies yet- He has appointment with PCP next week encouraged to continue with any appropriate workup by PCP to rule out other causes of symptoms. Will continue prednisone 15 mg today. Plan to continue for 4-6 weeks then evaluate. Discussed future evaluation by Rheumatologist, MD to assist in prednisone dosage/taper and potential additional workup.

## 2025-08-04 NOTE — PROGRESS NOTES
Subjective:           Patient ID: Fracisco Wynn is a 60 y.o. male.    Chief Complaint: Follow-up (States that he is feeling better since virtual visit. Complains of left shoulder pain)      Mr. Wynn is a 60-year-old male here for follow-up. Hx of cerebral palsy and ambulating with walker today. The patient completed 2 years of steroids for PMR. He lives by himself and independent with his activities and ADL's.  He does stationary bike at home 4 times per week.      Denies history of fevers, rashes, photosensitivity, oral or nasal ulcers, h/o MI, stroke, seizures, h/o PE or DVT, Raynaud's phenomenon, uveitis, malignancies.      Family history of autoimmune disease: None  Smoking: Nonsmoker     Feb 28, 2024: Continues on steroid sparing meds for PMR: MTX 15mg weekly and  mg BID. Feeling great. No flares. No complaints. Denies any joint pain. Continues to be active with stationary bike and is independent with his activities. He feels like his medications are all working well for him right now and would like to discuss decreasing the methotrexate. No concerns today. Denies any infections since last office visit. BP is elevated today- denies H/A reports he gets nervous when he goes to doctors offices.     June 2024: Continues on reduced dose of MTX 7.5mg Q 7 days, folic acid daily and  mg BID. Denies any increased joint pain or flare ups with decreased dose of MTX. He stands and shows me how easy it is to move his legs and feet. Reports he mowed the grass yesterday without any increased pain. Continues to be active by walking and with his bike for 20 minutes 4 x week. Denies any infections since last office visit. BP is elevated today Labs completed in April 2024.      October 2024: Continues on  mg BID. No increase in joint pain since stopping MTX at last visit in June 2024. Reports recent fall and injury to left foot, completed xrays and was prescribed short course of Meloxicam. Denies  "morning stiffness/ joint pain. Doing well. Continues to ride stationary bike about 4 x week. Does report trouble sleeping and white patches on tongue, reports he will address these issues with PCP. BP slightly elevated today. Denies any recent infections. Plaquenil Eye Exam completed June 2024 March 2025: Reports he is doing great. He Continues on  mg daily. Reports he self reduced HCQ to once daily to see if he would notice a difference before this visit. He denies joint pain in his shoulders, hips, bilateral hands. Denies morning stiffness. He actually reports his left hand does feel "looser" and slightly able to open it more. He continues to ride stationary bake. Elevated BP today. He does report nervousness about not wanting to get discharged from Rheumatology clinic and also did get visibly upset with elevated BP thus making BP more elevated. He did take his medication today and denies HA/blurry vision. Denies any recent infections. No complaints today.    Today July 2025: Virtual visit for pain Reports he started having increase joint pain last month in June and called office on 6/30/25 requesting to restart HCQ. Reports pain in bilateral hips as a "rollercoaster" . Reports that this pain comes and goes. For example 2 days ago he was able to attend a dentist appointment and today it is hard for him to walk. He is also c/o of left shoulder pain. Tried treatment: changed bed mattress and Advil with minimal improvement. Denies any change in physical activity, injury, or recent infections.  In review- he was diagnosed with PMR in Feb 2021, completed 2 years of steroids and was recently discontinued the steroid sparing agents. Due to pattern and severity of pain may be PMR flare? Will initiate trial of low dose glucocorticoids- order labs including CRP and sed rate and f/u in 1.5 weeks at scheduled office visit. His mother, Regina, was present for entire duration of audio visit.    Today August 2025: f/u " of potential PRM Flare. Initiated prednisone 15 mg on 7/25/25 with significant and rapid improvement, Bilateral hip pain/girdle pain resolved, able to raise bilateral arms, weakness significantly improved. Reports 5 days later he was able to mow his grass (prior was barely able to walk) but still continues with some left shoulder pain.  Reports these are similar symptoms at the beginning diagnosis of PMR 4 years ago. Discussed GCA symptoms- denies new-onset HA, abrupt onset of visual impairment, jaw claudication, or unexplained fever. Discussed PMR flare vs other etiology? No recent imaging of spine or hips to check for other causes of associated symptoms. Labs completed today- normal inflammatory markers. Significant response to prednisone- possible PMR flare but unable to rule out other etiologies yet. He has appointment with PCP next week encouraged to continue with any appropriate workup by PCP to rule out other causes of symptoms. Will continue prednisone 15 mg today. Plan to continue and f/u in 4 weeks to reevaluate. Discussed evaluation by Rheumatologist, MD in the future to assist in prednisone dosage/taper and potential additional workup. Patients mother present for entire duration of visit       Review of Systems   Constitutional:  Negative for appetite change, chills and fever.   HENT:  Negative for congestion, ear pain, mouth sores, nosebleeds and trouble swallowing.    Eyes:  Negative for photophobia and discharge.   Respiratory:  Negative for chest tightness and shortness of breath.    Cardiovascular:  Negative for chest pain.   Gastrointestinal:  Negative for abdominal pain and vomiting.   Endocrine: Negative.    Genitourinary:  Negative for hematuria.   Musculoskeletal:  Positive for arthralgias.        As per HPI   Skin:  Negative for rash.   Neurological:  Negative for weakness.   Psychiatric/Behavioral:          Trouble sleeping       Objective:   BP (!) 148/104 (BP Location: Right arm, Patient  "Position: Sitting)   Pulse (!) 118   Temp 98 °F (36.7 °C) (Oral)   Resp 20   Ht 5' 10" (1.778 m)   Wt 80.6 kg (177 lb 9.6 oz)   BMI 25.48 kg/m²      Physical Exam   Constitutional: He is oriented to person, place, and time. He appears well-developed and well-nourished. No distress.   HENT:   Head: Normocephalic and atraumatic.   Right Ear: External ear normal.   Left Ear: External ear normal.   Eyes: Pupils are equal, round, and reactive to light.   Cardiovascular: Normal rate.   Pulmonary/Chest: Effort normal and breath sounds normal.   Abdominal: There is abdominal tenderness.   Musculoskeletal:      Cervical back: Neck supple.      Comments: Left hand contracture but is able to open it   Lymphadenopathy:     He has no cervical adenopathy.   Neurological: He is alert and oriented to person, place, and time. He displays normal reflexes. No cranial nerve deficit or sensory deficit. He exhibits normal muscle tone. Coordination normal.   Skin: No rash noted. No erythema.   Vitals reviewed.       No data to display     Assessment:       Medication List with Changes/Refills   Current Medications    HYDROXYCHLOROQUINE (PLAQUENIL) 200 MG TABLET    Take 1 tablet (200 mg total) by mouth 2 (two) times daily. After food       Start Date: 6/30/2025 End Date: 12/31/2025    LOSARTAN (COZAAR) 25 MG TABLET    TAKE ONE TABLET ONCE DAILY       Start Date: 7/1/2025  End Date: --    PREDNISONE (DELTASONE) 10 MG TABLET    Take 1.5 tablets (15 mg total) by mouth once daily. AFTER BREAKFAST       Start Date: 7/24/2025 End Date: --         ICD-10-CM ICD-9-CM   1. Polymyalgia rheumatica  M35.3 725   2. Drug-induced immunodeficiency  D84.821 279.3    Z79.899 E947.9   3. Primary hypertension  I10 401.9   4. Impaired mobility  Z74.09 799.89         Plan:       1. 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  He completed 2 years of steroids for PMR. Prednisone eventually changed to only PRN then discontinued and continued steroid sparing medications: MTX and HCQ. Started decreasing these meds. In Feb 2024 MTX reduced from 15 to 7.5mg. In June 2024 MTX was discontinued. HCQ eventually discontinued 2025.    Virtual Visit 7/24/25- having increased pain in bilateral hips- with good days with minimal pain and others barely able to walk.  Pattern and severity of pain may be PMR flare? Following [employer], initiated a trial of low dose glucocorticoids-  prednisone 15 mg daily. Reassessed today with significant improvement. He is now able to walk, resolution of pain in bilateral hips, Reports within 4 days he was able to mow his grass. Did discuss to monitor for GCA symptoms: new-onset HA, abrupt onset of visual impairment, jaw claudication, unexplained fever. Denies all of these.    PMR flare vs other etiology? No recent imaging of spine or hips to check for other causes of associated symptoms. Labs completed today- normal inflammatory markers. Significant and rapid response to prednisone- possible PMR flare but cannot rule out other etiologies yet- He has appointment with PCP next week encouraged to continue with any appropriate workup by PCP to rule out other causes of symptoms. Will continue prednisone 15 mg today. Plan to continue for 4-6 weeks then evaluate. Discussed future evaluation by Rheumatologist, MD to assist in prednisone dosage/taper and potential additional workup.         2. Drug-induced immunodeficiency  Assessment & Plan:  Currently on Prednisone 15 mg daily for PMR      3. Primary hypertension  Assessment & Plan:  BP today is 154/104  Manual recheck: 148/108  Manual recheck after visit: 148/104  Reports he took his medication today. He does have a BP cuff at his [facility] house- lives 3 blocks away. Encouraged to check BP at home daily until  PCP visit next week.  He does endorse nervousness at medical appointments and is upset about BP being elevated,  Denies HA/blurry vision  Continue RX medications as prescribed by managing provider-   ER precautions previously given      4. Impaired mobility  Assessment & Plan:  Uses walker for ambulation today            44 minutes of total time spent on the encounter, which includes face to face time and non-face to face time preparing to see the patient (eg, review of tests), Obtaining and/or reviewing separately obtained history, Documenting clinical information in the electronic or other health record, Independently interpreting results (not separately reported) and communicating results to the patient/family/caregiver, or Care coordination (not separately reported).

## 2025-08-04 NOTE — PROGRESS NOTES
Labs ordered to be completed before the visit or after the office visit. Scheduled visit tomorrow 8/5/25 at 1:30PM

## 2025-08-05 ENCOUNTER — TELEPHONE (OUTPATIENT)
Dept: INTERNAL MEDICINE | Facility: CLINIC | Age: 60
End: 2025-08-05
Payer: MEDICARE

## 2025-08-05 ENCOUNTER — OFFICE VISIT (OUTPATIENT)
Dept: RHEUMATOLOGY | Facility: CLINIC | Age: 60
End: 2025-08-05
Payer: MEDICARE

## 2025-08-05 ENCOUNTER — LAB VISIT (OUTPATIENT)
Dept: LAB | Facility: HOSPITAL | Age: 60
End: 2025-08-05
Payer: MEDICARE

## 2025-08-05 VITALS
SYSTOLIC BLOOD PRESSURE: 148 MMHG | BODY MASS INDEX: 25.43 KG/M2 | WEIGHT: 177.63 LBS | RESPIRATION RATE: 20 BRPM | TEMPERATURE: 98 F | HEIGHT: 70 IN | DIASTOLIC BLOOD PRESSURE: 104 MMHG | HEART RATE: 118 BPM

## 2025-08-05 DIAGNOSIS — I10 PRIMARY HYPERTENSION: ICD-10-CM

## 2025-08-05 DIAGNOSIS — Z74.09 IMPAIRED MOBILITY: ICD-10-CM

## 2025-08-05 DIAGNOSIS — Z79.899 DRUG-INDUCED IMMUNODEFICIENCY: ICD-10-CM

## 2025-08-05 DIAGNOSIS — D84.821 DRUG-INDUCED IMMUNODEFICIENCY: ICD-10-CM

## 2025-08-05 DIAGNOSIS — M35.3 POLYMYALGIA RHEUMATICA: Primary | ICD-10-CM

## 2025-08-05 DIAGNOSIS — M35.3 POLYMYALGIA RHEUMATICA: ICD-10-CM

## 2025-08-05 PROBLEM — R03.0 ELEVATED BLOOD PRESSURE READING IN OFFICE WITHOUT DIAGNOSIS OF HYPERTENSION: Status: RESOLVED | Noted: 2024-06-26 | Resolved: 2025-08-05

## 2025-08-05 LAB
ALBUMIN SERPL-MCNC: 4 G/DL (ref 3.5–5)
ALBUMIN/GLOB SERPL: 1.4 RATIO (ref 1.1–2)
ALP SERPL-CCNC: 49 UNIT/L (ref 40–150)
ALT SERPL-CCNC: 13 UNIT/L (ref 0–55)
ANION GAP SERPL CALC-SCNC: 7 MEQ/L
AST SERPL-CCNC: 12 UNIT/L (ref 11–45)
BASOPHILS # BLD AUTO: 0.07 X10(3)/MCL
BASOPHILS NFR BLD AUTO: 0.8 %
BILIRUB SERPL-MCNC: 0.7 MG/DL
BUN SERPL-MCNC: 17.9 MG/DL (ref 8.4–25.7)
CALCIUM SERPL-MCNC: 9.7 MG/DL (ref 8.4–10.2)
CHLORIDE SERPL-SCNC: 106 MMOL/L (ref 98–107)
CO2 SERPL-SCNC: 29 MMOL/L (ref 22–29)
CREAT SERPL-MCNC: 0.75 MG/DL (ref 0.72–1.25)
CREAT/UREA NIT SERPL: 24
CRP SERPL-MCNC: 3.5 MG/L
EOSINOPHIL # BLD AUTO: 0.08 X10(3)/MCL (ref 0–0.9)
EOSINOPHIL NFR BLD AUTO: 0.9 %
ERYTHROCYTE [DISTWIDTH] IN BLOOD BY AUTOMATED COUNT: 12.9 % (ref 11.5–17)
ERYTHROCYTE [SEDIMENTATION RATE] IN BLOOD: 14 MM/HR (ref 0–20)
GFR SERPLBLD CREATININE-BSD FMLA CKD-EPI: >60 ML/MIN/1.73/M2
GLOBULIN SER-MCNC: 2.9 GM/DL (ref 2.4–3.5)
GLUCOSE SERPL-MCNC: 94 MG/DL (ref 74–100)
HBV CORE AB SERPL QL IA: NONREACTIVE
HBV SURFACE AB SER-ACNC: 0.32 MIU/ML
HBV SURFACE AB SERPL IA-ACNC: NONREACTIVE M[IU]/ML
HBV SURFACE AG SERPL QL IA: NONREACTIVE
HCT VFR BLD AUTO: 45.4 % (ref 42–52)
HCV AB SERPL QL IA: NONREACTIVE
HGB BLD-MCNC: 15.1 G/DL (ref 14–18)
HIV 1+2 AB+HIV1 P24 AG SERPL QL IA: NONREACTIVE
IMM GRANULOCYTES # BLD AUTO: 0.03 X10(3)/MCL (ref 0–0.04)
IMM GRANULOCYTES NFR BLD AUTO: 0.3 %
LYMPHOCYTES # BLD AUTO: 1.78 X10(3)/MCL (ref 0.6–4.6)
LYMPHOCYTES NFR BLD AUTO: 20.5 %
MCH RBC QN AUTO: 30 PG (ref 27–31)
MCHC RBC AUTO-ENTMCNC: 33.3 G/DL (ref 33–36)
MCV RBC AUTO: 90.1 FL (ref 80–94)
MONOCYTES # BLD AUTO: 0.66 X10(3)/MCL (ref 0.1–1.3)
MONOCYTES NFR BLD AUTO: 7.6 %
NEUTROPHILS # BLD AUTO: 6.06 X10(3)/MCL (ref 2.1–9.2)
NEUTROPHILS NFR BLD AUTO: 69.9 %
NRBC BLD AUTO-RTO: 0 %
PLATELET # BLD AUTO: 242 X10(3)/MCL (ref 130–400)
PMV BLD AUTO: 9.7 FL (ref 7.4–10.4)
POTASSIUM SERPL-SCNC: 4.1 MMOL/L (ref 3.5–5.1)
PROT SERPL-MCNC: 6.9 GM/DL (ref 6.4–8.3)
RBC # BLD AUTO: 5.04 X10(6)/MCL (ref 4.7–6.1)
SODIUM SERPL-SCNC: 142 MMOL/L (ref 136–145)
WBC # BLD AUTO: 8.68 X10(3)/MCL (ref 4.5–11.5)

## 2025-08-05 PROCEDURE — 87389 HIV-1 AG W/HIV-1&-2 AB AG IA: CPT

## 2025-08-05 PROCEDURE — 86140 C-REACTIVE PROTEIN: CPT

## 2025-08-05 PROCEDURE — 36415 COLL VENOUS BLD VENIPUNCTURE: CPT

## 2025-08-05 PROCEDURE — 86803 HEPATITIS C AB TEST: CPT

## 2025-08-05 PROCEDURE — 85652 RBC SED RATE AUTOMATED: CPT

## 2025-08-05 PROCEDURE — 86480 TB TEST CELL IMMUN MEASURE: CPT

## 2025-08-05 PROCEDURE — 99999 PR PBB SHADOW E&M-EST. PATIENT-LVL IV: CPT | Mod: PBBFAC,,,

## 2025-08-05 PROCEDURE — 86706 HEP B SURFACE ANTIBODY: CPT

## 2025-08-05 PROCEDURE — 80053 COMPREHEN METABOLIC PANEL: CPT

## 2025-08-05 PROCEDURE — 87340 HEPATITIS B SURFACE AG IA: CPT

## 2025-08-05 PROCEDURE — 99214 OFFICE O/P EST MOD 30 MIN: CPT | Mod: PBBFAC

## 2025-08-05 PROCEDURE — 86704 HEP B CORE ANTIBODY TOTAL: CPT

## 2025-08-05 PROCEDURE — 99215 OFFICE O/P EST HI 40 MIN: CPT | Mod: S$PBB,,,

## 2025-08-05 PROCEDURE — 85025 COMPLETE CBC W/AUTO DIFF WBC: CPT

## 2025-08-05 NOTE — ASSESSMENT & PLAN NOTE
BP today is 154/104  Manual recheck: 148/108  Manual recheck after visit: 148/104  Reports he took his medication today. He does have a BP cuff at his mothers house- lives 3 blocks away. Encouraged to check BP at home daily until PCP visit next week.  He does endorse nervousness at medical appointments and is upset about BP being elevated,  Denies HA/blurry vision  Continue RX medications as prescribed by managing provider-   ER precautions previously given

## 2025-08-07 LAB
GAMMA INTERFERON BACKGROUND BLD IA-ACNC: 0.02 IU/ML
M TB IFN-G BLD-IMP: NEGATIVE
M TB IFN-G CD4+ BCKGRND COR BLD-ACNC: 0 IU/ML
M TB IFN-G CD4+CD8+ BCKGRND COR BLD-ACNC: 0 IU/ML
MITOGEN IGNF BCKGRD COR BLD-ACNC: 9.98 IU/ML

## 2025-08-12 ENCOUNTER — OFFICE VISIT (OUTPATIENT)
Dept: INTERNAL MEDICINE | Facility: CLINIC | Age: 60
End: 2025-08-12
Payer: MEDICARE

## 2025-08-12 VITALS
SYSTOLIC BLOOD PRESSURE: 150 MMHG | HEIGHT: 70 IN | BODY MASS INDEX: 25.62 KG/M2 | DIASTOLIC BLOOD PRESSURE: 80 MMHG | OXYGEN SATURATION: 96 % | TEMPERATURE: 98 F | HEART RATE: 115 BPM | WEIGHT: 179 LBS

## 2025-08-12 DIAGNOSIS — M35.3 POLYMYALGIA RHEUMATICA: ICD-10-CM

## 2025-08-12 DIAGNOSIS — I10 PRIMARY HYPERTENSION: Primary | ICD-10-CM

## 2025-08-12 PROCEDURE — 99214 OFFICE O/P EST MOD 30 MIN: CPT | Mod: ,,, | Performed by: STUDENT IN AN ORGANIZED HEALTH CARE EDUCATION/TRAINING PROGRAM

## 2025-08-19 DIAGNOSIS — M35.3 POLYMYALGIA RHEUMATICA: ICD-10-CM

## 2025-08-19 RX ORDER — PREDNISONE 10 MG/1
15 TABLET ORAL DAILY
Qty: 45 TABLET | Refills: 1 | OUTPATIENT
Start: 2025-08-19